# Patient Record
Sex: FEMALE | Race: WHITE | Employment: OTHER | URBAN - METROPOLITAN AREA
[De-identification: names, ages, dates, MRNs, and addresses within clinical notes are randomized per-mention and may not be internally consistent; named-entity substitution may affect disease eponyms.]

---

## 2017-03-10 ENCOUNTER — ANESTHESIA EVENT (OUTPATIENT)
Dept: ENDOSCOPY | Age: 67
End: 2017-03-10
Payer: MEDICARE

## 2017-03-13 ENCOUNTER — SURGERY (OUTPATIENT)
Age: 67
End: 2017-03-13

## 2017-03-13 ENCOUNTER — ANESTHESIA (OUTPATIENT)
Dept: ENDOSCOPY | Age: 67
End: 2017-03-13
Payer: MEDICARE

## 2017-03-13 ENCOUNTER — HOSPITAL ENCOUNTER (OUTPATIENT)
Age: 67
Setting detail: OUTPATIENT SURGERY
Discharge: HOME | End: 2017-03-13
Attending: INTERNAL MEDICINE | Admitting: INTERNAL MEDICINE
Payer: MEDICARE

## 2017-03-13 VITALS
OXYGEN SATURATION: 100 % | SYSTOLIC BLOOD PRESSURE: 114 MMHG | HEART RATE: 61 BPM | HEIGHT: 68 IN | WEIGHT: 127 LBS | TEMPERATURE: 97.7 F | BODY MASS INDEX: 19.25 KG/M2 | DIASTOLIC BLOOD PRESSURE: 61 MMHG | RESPIRATION RATE: 12 BRPM

## 2017-03-13 PROCEDURE — 76060000031 HC ANESTHESIA FIRST 0.5 HR: Performed by: INTERNAL MEDICINE

## 2017-03-13 PROCEDURE — 77030009426 HC FCPS BIOP ENDOSC BSC -B: Performed by: INTERNAL MEDICINE

## 2017-03-13 PROCEDURE — 74011000250 HC RX REV CODE- 250

## 2017-03-13 PROCEDURE — 74011250636 HC RX REV CODE- 250/636

## 2017-03-13 PROCEDURE — 88305 TISSUE EXAM BY PATHOLOGIST: CPT | Performed by: INTERNAL MEDICINE

## 2017-03-13 PROCEDURE — 74011250636 HC RX REV CODE- 250/636: Performed by: NURSE ANESTHETIST, CERTIFIED REGISTERED

## 2017-03-13 PROCEDURE — 76040000019: Performed by: INTERNAL MEDICINE

## 2017-03-13 PROCEDURE — 74011000250 HC RX REV CODE- 250: Performed by: NURSE ANESTHETIST, CERTIFIED REGISTERED

## 2017-03-13 RX ORDER — FAMOTIDINE 10 MG/ML
20 INJECTION INTRAVENOUS ONCE
Status: DISCONTINUED | OUTPATIENT
Start: 2017-03-13 | End: 2017-03-13 | Stop reason: HOSPADM

## 2017-03-13 RX ORDER — SODIUM CHLORIDE, SODIUM LACTATE, POTASSIUM CHLORIDE, CALCIUM CHLORIDE 600; 310; 30; 20 MG/100ML; MG/100ML; MG/100ML; MG/100ML
125 INJECTION, SOLUTION INTRAVENOUS CONTINUOUS
Status: CANCELLED | OUTPATIENT
Start: 2017-03-13

## 2017-03-13 RX ORDER — MAGNESIUM SULFATE 100 %
4 CRYSTALS MISCELLANEOUS AS NEEDED
Status: CANCELLED | OUTPATIENT
Start: 2017-03-13

## 2017-03-13 RX ORDER — SODIUM CHLORIDE 0.9 % (FLUSH) 0.9 %
5-10 SYRINGE (ML) INJECTION EVERY 8 HOURS
Status: DISCONTINUED | OUTPATIENT
Start: 2017-03-13 | End: 2017-03-13 | Stop reason: HOSPADM

## 2017-03-13 RX ORDER — SODIUM CHLORIDE, SODIUM LACTATE, POTASSIUM CHLORIDE, CALCIUM CHLORIDE 600; 310; 30; 20 MG/100ML; MG/100ML; MG/100ML; MG/100ML
INJECTION, SOLUTION INTRAVENOUS
Status: DISCONTINUED | OUTPATIENT
Start: 2017-03-13 | End: 2017-03-13 | Stop reason: HOSPADM

## 2017-03-13 RX ORDER — HYDROCODONE BITARTRATE AND ACETAMINOPHEN 5; 325 MG/1; MG/1
1 TABLET ORAL
Status: CANCELLED | OUTPATIENT
Start: 2017-03-13 | End: 2017-03-13

## 2017-03-13 RX ORDER — DEXTROSE 50 % IN WATER (D50W) INTRAVENOUS SYRINGE
25-50 AS NEEDED
Status: CANCELLED | OUTPATIENT
Start: 2017-03-13

## 2017-03-13 RX ORDER — PROPOFOL 10 MG/ML
INJECTION, EMULSION INTRAVENOUS AS NEEDED
Status: DISCONTINUED | OUTPATIENT
Start: 2017-03-13 | End: 2017-03-13 | Stop reason: HOSPADM

## 2017-03-13 RX ORDER — SODIUM CHLORIDE, SODIUM LACTATE, POTASSIUM CHLORIDE, CALCIUM CHLORIDE 600; 310; 30; 20 MG/100ML; MG/100ML; MG/100ML; MG/100ML
75 INJECTION, SOLUTION INTRAVENOUS CONTINUOUS
Status: DISCONTINUED | OUTPATIENT
Start: 2017-03-13 | End: 2017-03-13 | Stop reason: HOSPADM

## 2017-03-13 RX ORDER — SODIUM CHLORIDE 0.9 % (FLUSH) 0.9 %
5-10 SYRINGE (ML) INJECTION AS NEEDED
Status: DISCONTINUED | OUTPATIENT
Start: 2017-03-13 | End: 2017-03-13 | Stop reason: HOSPADM

## 2017-03-13 RX ORDER — LIDOCAINE HYDROCHLORIDE 20 MG/ML
INJECTION, SOLUTION EPIDURAL; INFILTRATION; INTRACAUDAL; PERINEURAL AS NEEDED
Status: DISCONTINUED | OUTPATIENT
Start: 2017-03-13 | End: 2017-03-13 | Stop reason: HOSPADM

## 2017-03-13 RX ORDER — FAMOTIDINE 10 MG/ML
20 INJECTION INTRAVENOUS ONCE
Status: COMPLETED | OUTPATIENT
Start: 2017-03-13 | End: 2017-03-13

## 2017-03-13 RX ADMIN — PROPOFOL 50 MG: 10 INJECTION, EMULSION INTRAVENOUS at 09:02

## 2017-03-13 RX ADMIN — PROPOFOL 50 MG: 10 INJECTION, EMULSION INTRAVENOUS at 08:46

## 2017-03-13 RX ADMIN — LIDOCAINE HYDROCHLORIDE 60 MG: 20 INJECTION, SOLUTION EPIDURAL; INFILTRATION; INTRACAUDAL; PERINEURAL at 09:03

## 2017-03-13 RX ADMIN — FAMOTIDINE 20 MG: 10 INJECTION, SOLUTION INTRAVENOUS at 07:50

## 2017-03-13 RX ADMIN — SODIUM CHLORIDE, SODIUM LACTATE, POTASSIUM CHLORIDE, CALCIUM CHLORIDE: 600; 310; 30; 20 INJECTION, SOLUTION INTRAVENOUS at 08:46

## 2017-03-13 RX ADMIN — SODIUM CHLORIDE, SODIUM LACTATE, POTASSIUM CHLORIDE, AND CALCIUM CHLORIDE 75 ML/HR: 600; 310; 30; 20 INJECTION, SOLUTION INTRAVENOUS at 07:50

## 2017-03-13 RX ADMIN — PROPOFOL 50 MG: 10 INJECTION, EMULSION INTRAVENOUS at 08:52

## 2017-03-13 RX ADMIN — PROPOFOL 50 MG: 10 INJECTION, EMULSION INTRAVENOUS at 08:55

## 2017-03-13 NOTE — IP AVS SNAPSHOT
[address] [facility] 
 
 
 [address] [id number] [phone] Patient: Monster Eller MRN: [id number] [facility]:3/87/3211 You are allergic to the following Allergen Reactions Cephalosporins Other (comments) Unable to take Codeine Nausea and Vomiting Penicillins Anaphylaxis Recent Documentation Height Weight Breastfeeding? BMI OB Status Smoking Status 1.727 m 57.6 kg No 19.31 kg/m2 Hysterectomy Never Smoker Emergency Contacts Name Discharge Info Relation Home Work Mobile Handy Russ DISCHARGE CAREGIVER [3] Spouse [3] [phone] About your hospitalization You were admitted on:  March 13, 2017 You last received care in the:  [facility] You were discharged on:  March 13, 2017 Unit phone number:  [phone] Why you were hospitalized Your primary diagnosis was:  Not on File Providers Seen During Your Hospitalizations Provider Role Specialty Primary office phone Margot Hendrix MD Attending Provider Gastroenterology [phone] Your Primary Care Physician (PCP) Primary Care Physician Office Phone Office Fax Tracy Aquino [phone] Follow-up Information Follow up With Details Comments Contact Info Enoch Kimball MD   [address] [facility] [address] [place] [address] 
[phone] Current Discharge Medication List  
  
CONTINUE these medications which have CHANGED Dose & Instructions Dispensing Information Comments Morning Noon Evening Bedtime  
 celecoxib 200 mg capsule Commonly known as:  CeleBREX What changed:   
- when to take this 
- reasons to take this Your next dose is: Today, Tomorrow Other:  _________ Dose:  200 mg Take 1 Cap by mouth daily. Quantity:  90 Cap Refills:  1 montelukast 10 mg tablet Commonly known as:  ELDAULAIR What changed:  See the new instructions. Your next dose is: Today, Tomorrow Other:  _________  
   
   
 take 1 tablet by mouth daily Quantity:  90 Tab Refills:  2 CONTINUE these medications which have NOT CHANGED Dose & Instructions Dispensing Information Comments Morning Noon Evening Bedtime  
 estradiol 0.1 mg/24 hr  
Commonly known as:  Cristina Morita Your next dose is: Today, Tomorrow Other:  _________ Dose:  1 Patch 1 Patch by TransDERmal route every Monday. Refills:  0  
     
   
   
   
  
 fluticasone 50 mcg/actuation nasal spray Commonly known as:  Imagene Poot Your next dose is: Today, Tomorrow Other:  _________  
   
   
 instill 2 sprays into each nostril daily Quantity:  48 g Refills:  3 ZyrTEC 10 mg tablet Generic drug:  cetirizine Your next dose is: Today, Tomorrow Other:  _________ Take  by mouth daily. Refills:  0 Discharge Instructions DISCHARGE SUMMARY from Nurse POST-PROCEDURE INSTRUCTIONS: 
 
Call your Physician if you: 
? Observe any excess bleeding. ? Develop a temperature over 100.5o F. 
? Experience abdominal, shoulder or chest pain. ? Notice any signs of decreased circulation or nerve impairment to an extremity such as a change in color, persistent numbness, tingling, coldness or increase in pain. ? Vomit blood or you have nausea and vomiting lasting longer than 4 hours. ? Are unable to take medications. ? Are unable to urinate within 8 hours after discharge following general anesthesia or intravenous sedation.  
 
For the next 24 hours after receiving general anesthesia or intravenous sedation, or while taking prescription Narcotics, limit your activities: 
? Do NOT drive a motor vehicle, operate hazard machinery or power tools, or perform tasks that require coordination. The medication you received during your procedure may have some effect on your mental awareness. ? Do NOT make important personal or business decisions. The medication you received during your procedure may have some effect on your mental awareness. ? Do NOT drink alcoholic beverages. These drinks do not mix well with the medications that have been given to you. ? Upon discharge from the hospital, you must be accompanied by a responsible adult. ? Resume your diet as directed by your physician. ? Resume medications as your physician has prescribed. ? Please give a list of your current medications to your Primary Care Provider. ? Please update this list whenever your medications are discontinued, doses are changed, or new medications (including over-the-counter products) are added. ? Please carry medication information at all times in case of emergency situations. These are general instructions for a healthy lifestyle: No smoking/ No tobacco products/ Avoid exposure to second hand smoke. ? Surgeon General's Warning:  Quitting smoking now greatly reduces serious risk to your health. Obesity, smoking, and a sedentary lifestyle greatly increase your risk for illness. ? A healthy diet, regular physical exercise & weight monitoring are important for maintaining a healthy lifestyle ? You may be retaining fluid if you have a history of heart failure or if you experience any of the following symptoms:  Weight gain of 3 pounds or more overnight or 5 pounds in a week, increased swelling in our hands or feet or shortness of breath while lying flat in bed. Please call your doctor as soon as you notice any of these symptoms; do not wait until your next office visit. Recognize signs and symptoms of STROKE: 
F  -  Face looks uneven A  -  Arms unable to move or move unevenly S  -  Speech slurred or non-existent T  -  Time to call 911 - as soon as signs and symptoms begin - DO NOT go back to bed or wait to see If you get better - TIME IS BRAIN. Colorectal Screening ? Colorectal cancer almost always develops from precancerous polyps (abnormal growths) in the colon or rectum. Screening tests can find precancerous polyps, so that they can be removed before they turn into cancer. Screening tests can also find colorectal cancer early, when treatment works best. 
? Speak with your physician about when you should begin screening and how often you should be tested. MyChart Activation Thank you for requesting access to "[employer]". Please follow the instructions below to securely access and download your online medical record. "[employer]" allows you to send messages to your doctor, view your test results, renew your prescriptions, schedule appointments, and more. How Do I Sign Up? 1. In your internet browser, go to [email or web address]. 2. Click on the First Time User? Click Here link in the Sign In box. You will see the New Member Sign Up page. 3. Enter your "[employer]" Access Code exactly as it appears below. You will not need to use this code after youve completed the sign-up process. If you do not sign up before the expiration date, you must request a new code. "[employer]" Access Code: [email or web address] Expires: 2017 10:26 AM (This is the date your "[employer]" access code will ) 4. Enter the last four digits of your Social Security Number (xxxx) and Date of Birth (mm/dd/yyyy) as indicated and click Submit. You will be taken to the next sign-up page. 5. Create a "[employer]" ID. This will be your "[employer]" login ID and cannot be changed, so think of one that is secure and easy to remember. 6. Create a "[employer]" password. You can change your password at any time. 7. Enter your Password Reset Question and Answer.  This can be used at a later time if you forget your password. 8. Enter your e-mail address. You will receive e-mail notification when new information is available in [address] 9. Click Sign Up. You can now view and download portions of your medical record. 10. Click the Download Summary menu link to download a portable copy of your medical information. Additional Information If you have questions, please call [phone]. Remember, [employer] is NOT to be used for urgent needs. For medical emergencies, dial 911. Educational references and/or instructions provided during this visit included: 
 
Colon Polyps Discharge information has been reviewed with the patient. The patient verbalized understanding. Colon Polyps: Care Instructions Your Care Instructions Colon polyps are growths in the colon or the rectum. The cause of most colon polyps is not known, and most people who get them do not have any problems. But a certain kind can turn into cancer. For this reason, regular testing for colon polyps is important for people age 48 and older and anyone who has an increased risk for colon cancer. Polyps are usually found through routine colon cancer screening tests. Although most colon polyps are not cancerous, they are usually removed and then tested for cancer. Screening for colon cancer saves lives because the cancer can usually be cured if it is caught early. If you have a polyp that is the type that can turn into cancer, you may need more tests to examine your entire colon. The doctor will remove any other polyps that he or she finds, and you will be tested more often. Follow-up care is a key part of your treatment and safety. Be sure to make and go to all appointments, and call your doctor if you are having problems. It's also a good idea to know your test results and keep a list of the medicines you take. How can you care for yourself at home? Regular exams to look for colon polyps are the best way to prevent polyps from turning into colon cancer. These can include stool tests, sigmoidoscopy, colonoscopy, and CT colonography. Talk with your doctor about a testing schedule that is right for you. To prevent polyps There is no home treatment that can prevent colon polyps. But these steps may help lower your risk for cancer. · Stay active. Being active can help you get to and stay at a healthy weight. Try to exercise on most days of the week. Walking is a good choice. · Eat well. Choose a variety of vegetables, fruits, legumes (such as peas and beans), fish, poultry, and whole grains. · Do not smoke. If you need help quitting, talk to your doctor about stop-smoking programs and medicines. These can increase your chances of quitting for good. · If you drink alcohol, limit how much you drink. Limit alcohol to 2 drinks a day for men and 1 drink a day for women. When should you call for help? Call your doctor now or seek immediate medical care if: 
· You have severe belly pain. · Your stools are maroon or very bloody. Watch closely for changes in your health, and be sure to contact your doctor if: 
· You have a fever. · You have nausea or vomiting. · You have a change in bowel habits (new constipation or diarrhea). · Your symptoms get worse or are not improving as expected. Where can you learn more? Go to http://michelle-roro.info/. Enter 95 477447 in the search box to learn more about \"Colon Polyps: Care Instructions. \" Current as of: August 24, 2016 Content Version: 11.1 © 8175-2664 [employer]. Care instructions adapted under license by [employer] (which disclaims liability or warranty for this information).  If you have questions about a medical condition or this instruction, always ask your healthcare professional. [address] any warranty or liability for your use of this information. Colonoscopy: What to Expect at [facility] Your Recovery After you have a colonoscopy, you will stay at the clinic for 1 to 2 hours until the medicines wear off. Then you can go home. But you will need to arrange for a ride. Your doctor will tell you when you can eat and do your other usual activities. Your doctor will talk to you about when you will need your next colonoscopy. Your doctor can help you decide how often you need to be checked. This will depend on the results of your test and your risk for colorectal cancer. After the test, you may be bloated or have gas pains. You may need to pass gas. If a biopsy was done or a polyp was removed, you may have streaks of blood in your stool (feces) for a few days. This care sheet gives you a general idea about how long it will take for you to recover. But each person recovers at a different pace. Follow the steps below to get better as quickly as possible. How can you care for yourself at home? Activity · Rest when you feel tired. · You can do your normal activities when it feels okay to do so. Diet · Follow your doctor's directions for eating. · Unless your doctor has told you not to, drink plenty of fluids. This helps to replace the fluids that were lost during the colon prep. · Do not drink alcohol. Medicines · Your doctor will tell you if and when you can restart your medicines. He or she will also give you instructions about taking any new medicines. · If you take blood thinners, such as warfarin (Coumadin), clopidogrel (Plavix), or aspirin, be sure to talk to your doctor. He or she will tell you if and when to start taking those medicines again. Make sure that you understand exactly what your doctor wants you to do.  
· If polyps were removed or a biopsy was done during the test, your doctor may tell you not to take aspirin or other anti-inflammatory medicines for a few days. These include ibuprofen (Advil, Motrin) and naproxen (Aleve). Other instructions · For your safety, do not drive or operate machinery until the medicine wears off and you can think clearly. Your doctor may tell you not to drive or operate machinery until the day after your test. 
· Do not sign legal documents or make major decisions until the medicine wears off and you can think clearly. The anesthesia can make it hard for you to fully understand what you are agreeing to. Follow-up care is a key part of your treatment and safety. Be sure to make and go to all appointments, and call your doctor if you are having problems. It's also a good idea to know your test results and keep a list of the medicines you take. When should you call for help? Call 911 anytime you think you may need emergency care. For example, call if: 
· You passed out (lost consciousness). · You pass maroon or bloody stools. · You have severe belly pain. Call your doctor now or seek immediate medical care if: 
· Your stools are black and tarlike. · Your stools have streaks of blood, but you did not have a biopsy or any polyps removed. · You have belly pain, or your belly is swollen and firm. · You vomit. · You have a fever. · You are very dizzy. Watch closely for changes in your health, and be sure to contact your doctor if you have any problems. Where can you learn more? Go to http://michelle-roro.info/. Enter E264 in the search box to learn more about \"Colonoscopy: What to Expect at Home. \" Current as of: August 9, 2016 Content Version: 11.1 © 5108-0993 [employer]. Care instructions adapted under license by [employer] (which disclaims liability or warranty for this information).  If you have questions about a medical condition or this instruction, always ask your healthcare professional. [address] any warranty or liability for your use of this information. Discharge Orders None Introducing [facility]! [facility] introduces [employer] patient portal. Now you can access parts of your medical record, email your doctor's office, and request medication refills online. 1. In your internet browser, go to [email or web address] 2. Click on the First Time User? Click Here link in the Sign In box. You will see the New Member Sign Up page. 3. Enter your [employer] Access Code exactly as it appears below. You will not need to use this code after youve completed the sign-up process. If you do not sign up before the expiration date, you must request a new code. · [employer] Access Code: [email or web address] Expires: 6/7/2017 10:26 AM 
 
4. Enter the last four digits of your Social Security Number (xxxx) and Date of Birth (mm/dd/yyyy) as indicated and click Submit. You will be taken to the next sign-up page. 5. Create a [employer] ID. This will be your [employer] login ID and cannot be changed, so think of one that is secure and easy to remember. 6. Create a [employer] password. You can change your password at any time. 7. Enter your Password Reset Question and Answer. This can be used at a later time if you forget your password. 8. Enter your e-mail address. You will receive e-mail notification when new information is available in [address] 9. Click Sign Up. You can now view and download portions of your medical record. 10. Click the Download Summary menu link to download a portable copy of your medical information. If you have questions, please visit the Frequently Asked Questions section of the [employer] website. Remember, [employer] is NOT to be used for urgent needs. For medical emergencies, dial 911. Now available from your iPhone and Android! General Information Please provide this summary of care documentation to your next provider.  
  
  
    
    
 Patient Signature: ____________________________________________________________ Date:  ____________________________________________________________  
  
Scharlene Alosa Provider Signature:  ____________________________________________________________ Date:  ____________________________________________________________

## 2017-03-13 NOTE — DISCHARGE INSTRUCTIONS
DISCHARGE SUMMARY from Nurse     POST-PROCEDURE INSTRUCTIONS:    Call your Physician if you:  ? Observe any excess bleeding. ? Develop a temperature over 100.5o F.  ? Experience abdominal, shoulder or chest pain. ? Notice any signs of decreased circulation or nerve impairment to an extremity such as a change in color, persistent numbness, tingling, coldness or increase in pain. ? Vomit blood or you have nausea and vomiting lasting longer than 4 hours. ? Are unable to take medications. ? Are unable to urinate within 8 hours after discharge following general anesthesia or intravenous sedation. For the next 24 hours after receiving general anesthesia or intravenous sedation, or while taking prescription Narcotics, limit your activities:  ? Do NOT drive a motor vehicle, operate hazard machinery or power tools, or perform tasks that require coordination. The medication you received during your procedure may have some effect on your mental awareness. ? Do NOT make important personal or business decisions. The medication you received during your procedure may have some effect on your mental awareness. ? Do NOT drink alcoholic beverages. These drinks do not mix well with the medications that have been given to you. ? Upon discharge from the hospital, you must be accompanied by a responsible adult. ? Resume your diet as directed by your physician. ? Resume medications as your physician has prescribed. ? Please give a list of your current medications to your Primary Care Provider. ? Please update this list whenever your medications are discontinued, doses are changed, or new medications (including over-the-counter products) are added. ? Please carry medication information at all times in case of emergency situations. These are general instructions for a healthy lifestyle:    No smoking/ No tobacco products/ Avoid exposure to second hand smoke.    Surgeon General's Warning: Quitting smoking now greatly reduces serious risk to your health. Obesity, smoking, and a sedentary lifestyle greatly increase your risk for illness.  A healthy diet, regular physical exercise & weight monitoring are important for maintaining a healthy lifestyle   You may be retaining fluid if you have a history of heart failure or if you experience any of the following symptoms:  Weight gain of 3 pounds or more overnight or 5 pounds in a week, increased swelling in our hands or feet or shortness of breath while lying flat in bed. Please call your doctor as soon as you notice any of these symptoms; do not wait until your next office visit. Recognize signs and symptoms of STROKE:  F  -  Face looks uneven  A  -  Arms unable to move or move unevenly  S  -  Speech slurred or non-existent  T  -  Time to call 911 - as soon as signs and symptoms begin - DO NOT go back to bed or wait to see If you get better - TIME IS BRAIN. Colorectal Screening   Colorectal cancer almost always develops from precancerous polyps (abnormal growths) in the colon or rectum. Screening tests can find precancerous polyps, so that they can be removed before they turn into cancer. Screening tests can also find colorectal cancer early, when treatment works best.  Silverio Speak with your physician about when you should begin screening and how often you should be tested. ZBD Displays Activation    Thank you for requesting access to [employer]. Please follow the instructions below to securely access and download your online medical record. [employer] Displays allows you to send messages to your doctor, view your test results, renew your prescriptions, schedule appointments, and more. How Do I Sign Up? 1. In your internet browser, go to [email or web address]. 2. Click on the First Time User? Click Here link in the Sign In box. You will see the New Member Sign Up page.   3. Enter your Ac[employer] Access Code exactly as it appears below. You will not need to use this code after youve completed the sign-up process. If you do not sign up before the expiration date, you must request a new code. Vivacta Access Code: [email or web address]  Expires: 2017 10:26 AM (This is the date your Vivacta access code will )    4. Enter the last four digits of your Social Security Number (xxxx) and Date of Birth (mm/dd/yyyy) as indicated and click Submit. You will be taken to the next sign-up page. 5. Create a Symmetric Computingt ID. This will be your Vivacta login ID and cannot be changed, so think of one that is secure and easy to remember. 6. Create a Vivacta password. You can change your password at any time. 7. Enter your Password Reset Question and Answer. This can be used at a later time if you forget your password. 8. Enter your e-mail address. You will receive e-mail notification when new information is available in [address]. 9. Click Sign Up. You can now view and download portions of your medical record. 10. Click the Download Summary menu link to download a portable copy of your medical information. Additional Information    If you have questions, please call [phone]. Remember, Vivacta is NOT to be used for urgent needs. For medical emergencies, dial 911. Educational references and/or instructions provided during this visit included:    Colon Polyps            Discharge information has been reviewed with the patient. The patient verbalized understanding. Colon Polyps: Care Instructions  Your Care Instructions    Colon polyps are growths in the colon or the rectum. The cause of most colon polyps is not known, and most people who get them do not have any problems. But a certain kind can turn into cancer. For this reason, regular testing for colon polyps is important for people age 48 and older and anyone who has an increased risk for colon cancer.   Polyps are usually found through routine colon cancer screening tests. Although most colon polyps are not cancerous, they are usually removed and then tested for cancer. Screening for colon cancer saves lives because the cancer can usually be cured if it is caught early. If you have a polyp that is the type that can turn into cancer, you may need more tests to examine your entire colon. The doctor will remove any other polyps that he or she finds, and you will be tested more often. Follow-up care is a key part of your treatment and safety. Be sure to make and go to all appointments, and call your doctor if you are having problems. It's also a good idea to know your test results and keep a list of the medicines you take. How can you care for yourself at home? Regular exams to look for colon polyps are the best way to prevent polyps from turning into colon cancer. These can include stool tests, sigmoidoscopy, colonoscopy, and CT colonography. Talk with your doctor about a testing schedule that is right for you. To prevent polyps  There is no home treatment that can prevent colon polyps. But these steps may help lower your risk for cancer. · Stay active. Being active can help you get to and stay at a healthy weight. Try to exercise on most days of the week. Walking is a good choice. · Eat well. Choose a variety of vegetables, fruits, legumes (such as peas and beans), fish, poultry, and whole grains. · Do not smoke. If you need help quitting, talk to your doctor about stop-smoking programs and medicines. These can increase your chances of quitting for good. · If you drink alcohol, limit how much you drink. Limit alcohol to 2 drinks a day for men and 1 drink a day for women. When should you call for help? Call your doctor now or seek immediate medical care if:  · You have severe belly pain. · Your stools are maroon or very bloody. Watch closely for changes in your health, and be sure to contact your doctor if:  · You have a fever.   · You have nausea or vomiting. · You have a change in bowel habits (new constipation or diarrhea). · Your symptoms get worse or are not improving as expected. Where can you learn more? Go to http://michelle-roro.info/. Enter 95 [id number] in the search box to learn more about \"Colon Polyps: Care Instructions. \"  Current as of: August 24, 2016  Content Version: 11.1  © 2006-2016 [employer]. Care instructions adapted under license by [employer] (which disclaims liability or warranty for this information). If you have questions about a medical condition or this instruction, always ask your healthcare professional. [place] [address] any warranty or liability for your use of this information. Colonoscopy: What to Expect at [address] [place] [address]  After you have a colonoscopy, you will stay at the clinic for 1 to 2 hours until the medicines wear off. Then you can go home. But you will need to arrange for a ride. Your doctor will tell you when you can eat and do your other usual activities. Your doctor will talk to you about when you will need your next colonoscopy. Your doctor can help you decide how often you need to be checked. This will depend on the results of your test and your risk for colorectal cancer. After the test, you may be bloated or have gas pains. You may need to pass gas. If a biopsy was done or a polyp was removed, you may have streaks of blood in your stool (feces) for a few days. This care sheet gives you a general idea about how long it will take for you to recover. But each person recovers at a different pace. Follow the steps below to get better as quickly as possible. How can you care for yourself at home? Activity  · Rest when you feel tired. · You can do your normal activities when it feels okay to do so. Diet  · Follow your doctor's directions for eating. · Unless your doctor has told you not to, drink plenty of fluids.  This helps to replace the fluids that were lost during the colon prep. · Do not drink alcohol. Medicines  · Your doctor will tell you if and when you can restart your medicines. He or she will also give you instructions about taking any new medicines. · If you take blood thinners, such as warfarin (Coumadin), clopidogrel (Plavix), or aspirin, be sure to talk to your doctor. He or she will tell you if and when to start taking those medicines again. Make sure that you understand exactly what your doctor wants you to do. · If polyps were removed or a biopsy was done during the test, your doctor may tell you not to take aspirin or other anti-inflammatory medicines for a few days. These include ibuprofen (Advil, Motrin) and naproxen (Aleve). Other instructions  · For your safety, do not drive or operate machinery until the medicine wears off and you can think clearly. Your doctor may tell you not to drive or operate machinery until the day after your test.  · Do not sign legal documents or make major decisions until the medicine wears off and you can think clearly. The anesthesia can make it hard for you to fully understand what you are agreeing to. Follow-up care is a key part of your treatment and safety. Be sure to make and go to all appointments, and call your doctor if you are having problems. It's also a good idea to know your test results and keep a list of the medicines you take. When should you call for help? Call 911 anytime you think you may need emergency care. For example, call if:  · You passed out (lost consciousness). · You pass maroon or bloody stools. · You have severe belly pain. Call your doctor now or seek immediate medical care if:  · Your stools are black and tarlike. · Your stools have streaks of blood, but you did not have a biopsy or any polyps removed. · You have belly pain, or your belly is swollen and firm. · You vomit. · You have a fever. · You are very dizzy.   Watch closely for changes in your health, and be sure to contact your doctor if you have any problems. Where can you learn more? Go to http://michelle-roro.info/. Enter E264 in the search box to learn more about \"Colonoscopy: What to Expect at Home. \"  Current as of: August 9, 2016  Content Version: 11.1  © 2570-3984 [employer], [employer]. Care instructions adapted under license by [employer] (which disclaims liability or warranty for this information). If you have questions about a medical condition or this instruction, always ask your healthcare professional. [address] any warranty or liability for your use of this information.

## 2017-03-13 NOTE — ANESTHESIA PREPROCEDURE EVALUATION
Anesthetic History   No history of anesthetic complications            Review of Systems / Medical History  Patient summary reviewed and pertinent labs reviewed    Pulmonary  Within defined limits                 Neuro/Psych   Within defined limits           Cardiovascular  Within defined limits                     GI/Hepatic/Renal  Within defined limits              Endo/Other        Arthritis     Other Findings   Comments: Current Smoker? NO       Elective Surgery? Yes       Abstained from smoking 24 hours prior to anesthesia? N/A    Risk Factors for Postoperative nausea/vomiting:       History of postoperative nausea/vomiting? NO       Female? YES       Motion sickness? YES       Intended opioid administration for postoperative analgesia?   NO           Physical Exam    Airway  Mallampati: II  TM Distance: 4 - 6 cm  Neck ROM: normal range of motion   Mouth opening: Normal     Cardiovascular  Regular rate and rhythm,  S1 and S2 normal,  no murmur, click, rub, or gallop             Dental    Dentition: Caps/crowns     Pulmonary                 Abdominal  GI exam deferred       Other Findings            Anesthetic Plan    ASA: 2  Anesthesia type: MAC            Anesthetic plan and risks discussed with: Patient

## 2017-03-13 NOTE — ANESTHESIA POSTPROCEDURE EVALUATION
Post-Anesthesia Evaluation and Assessment    Patient: Brian Kahn MRN: [id number]  SSN: [id number]    YOB: 1950  Age: 78 y.o. Sex: female      Data from PACU flowsheet    Cardiovascular Function/Vital Signs  Visit Vitals    /75    Pulse 66    Temp 36.5 °C (97.7 °F)    Resp 16    Ht 5' 8\" (1.727 m)    Wt 57.6 kg (127 lb)    SpO2 99%    Breastfeeding No    BMI 19.31 kg/m2       Patient is status post MAC anesthesia for Procedure(s):  COLONOSCOPY / polypectomy. Nausea/Vomiting: controlled    Postoperative hydration reviewed and adequate. Pain:  Pain Scale 1: Numeric (0 - 10) (03/13/17 0739)  Pain Intensity 1: 0 (03/13/17 0739)   Managed      Mental Status and Level of Consciousness: Alert and oriented     Pulmonary Status:   O2 Device: CO2 nasal cannula (03/13/17 0910)   Adequate oxygenation and airway patent    Complications related to anesthesia: None    Post-anesthesia assessment completed.  No concerns    Signed By: Martín Crowder MD     March 13, 2017

## 2017-03-13 NOTE — PROCEDURES
[email or web address]  [phone]        Brief Procedure Note    Sasha Kilgore  1950  [id number]    Date of Procedure: 3/13/2017    Preoperative diagnosis: Z12.11    Postoperative diagnosis: Colon Polyp, Diverticulosis    Description of Findings: same    Sedation/Anesthesia: Monitored Anesthesia Care; See Anesthesia Note    Procedure: Procedure(s):  COLONOSCOPY / polypectomy    :  Dr. Johnathon Leo MD    Assistant(s): Endoscopy Technician-1: Nestor Meza  Endoscopy RN-1: Chandni Chu RN    EBL:None    Specimens:   ID Type Source Tests Collected by Time Destination   1 : Cecum Polyp Preservative Cecum  Johnathon Leo MD 3/13/2017 8332 Pathology       Findings: See printed and scanned procedure note    Complications: None    Dr. Johnathon Leo MD  3/13/2017  9:19 AM    Johnathon Leo MD  [facility] [address], [address] [place] [address] [phone]  [email or web address]

## 2017-04-21 RX ORDER — FLUTICASONE PROPIONATE 50 MCG
SPRAY, SUSPENSION (ML) NASAL
Qty: 48 G | Refills: 3 | Status: SHIPPED | OUTPATIENT
Start: 2017-04-21 | End: 2018-03-06 | Stop reason: ALTCHOICE

## 2017-07-05 ENCOUNTER — HOSPITAL ENCOUNTER (OUTPATIENT)
Dept: LAB | Age: 67
Discharge: HOME | End: 2017-07-05
Payer: MEDICARE

## 2017-07-05 DIAGNOSIS — E78.5 HYPERLIPIDEMIA LDL GOAL <100: ICD-10-CM

## 2017-07-05 DIAGNOSIS — Z00.00 ROUTINE GENERAL MEDICAL EXAMINATION AT A HEALTH CARE FACILITY: ICD-10-CM

## 2017-07-05 LAB
ALBUMIN SERPL BCP-MCNC: 4.1 G/DL (ref 3.4–5)
ALBUMIN/GLOB SERPL: 1.6 {RATIO} (ref 0.8–1.7)
ALP SERPL-CCNC: 56 U/L (ref 45–117)
ALT SERPL-CCNC: 24 U/L (ref 13–56)
ANION GAP BLD CALC-SCNC: 9 MMOL/L (ref 3–18)
AST SERPL W P-5'-P-CCNC: 21 U/L (ref 15–37)
BASOPHILS # BLD AUTO: 0 K/UL (ref 0–0.06)
BASOPHILS # BLD: 1 % (ref 0–2)
BILIRUB SERPL-MCNC: 0.3 MG/DL (ref 0.2–1)
BUN SERPL-MCNC: 22 MG/DL (ref 7–18)
BUN/CREAT SERPL: 39 (ref 12–20)
CALCIUM SERPL-MCNC: 9.6 MG/DL (ref 8.5–10.1)
CHLORIDE SERPL-SCNC: 106 MMOL/L (ref 100–108)
CHOLEST SERPL-MCNC: 261 MG/DL
CO2 SERPL-SCNC: 25 MMOL/L (ref 21–32)
CREAT SERPL-MCNC: 0.56 MG/DL (ref 0.6–1.3)
DIFFERENTIAL METHOD BLD: ABNORMAL
EOSINOPHIL # BLD: 0.1 K/UL (ref 0–0.4)
EOSINOPHIL NFR BLD: 3 % (ref 0–5)
ERYTHROCYTE [DISTWIDTH] IN BLOOD BY AUTOMATED COUNT: 14.1 % (ref 11.6–14.5)
GLOBULIN SER CALC-MCNC: 2.6 G/DL (ref 2–4)
GLUCOSE SERPL-MCNC: 81 MG/DL (ref 74–99)
HCT VFR BLD AUTO: 47 % (ref 35–45)
HDLC SERPL-MCNC: 84 MG/DL (ref 40–60)
HDLC SERPL: 3.1 {RATIO} (ref 0–5)
HGB BLD-MCNC: 15.1 G/DL (ref 12–16)
LDLC SERPL CALC-MCNC: 164.2 MG/DL (ref 0–100)
LIPID PROFILE,FLP: ABNORMAL
LYMPHOCYTES # BLD AUTO: 28 % (ref 21–52)
LYMPHOCYTES # BLD: 1.5 K/UL (ref 0.9–3.6)
MCH RBC QN AUTO: 30.5 PG (ref 24–34)
MCHC RBC AUTO-ENTMCNC: 32.1 G/DL (ref 31–37)
MCV RBC AUTO: 94.9 FL (ref 74–97)
MONOCYTES # BLD: 0.3 K/UL (ref 0.05–1.2)
MONOCYTES NFR BLD AUTO: 6 % (ref 3–10)
NEUTS SEG # BLD: 3.6 K/UL (ref 1.8–8)
NEUTS SEG NFR BLD AUTO: 62 % (ref 40–73)
PLATELET # BLD AUTO: 271 K/UL (ref 135–420)
PMV BLD AUTO: 10.8 FL (ref 9.2–11.8)
POTASSIUM SERPL-SCNC: 4.4 MMOL/L (ref 3.5–5.5)
PROT SERPL-MCNC: 6.7 G/DL (ref 6.4–8.2)
RBC # BLD AUTO: 4.95 M/UL (ref 4.2–5.3)
SODIUM SERPL-SCNC: 140 MMOL/L (ref 136–145)
TRIGL SERPL-MCNC: 64 MG/DL (ref ?–150)
TSH SERPL DL<=0.05 MIU/L-ACNC: 0.88 UIU/ML (ref 0.36–3.74)
VLDLC SERPL CALC-MCNC: 12.8 MG/DL
WBC # BLD AUTO: 5.6 K/UL (ref 4.6–13.2)

## 2017-07-05 PROCEDURE — 36415 COLL VENOUS BLD VENIPUNCTURE: CPT | Performed by: INTERNAL MEDICINE

## 2017-07-05 PROCEDURE — 85025 COMPLETE CBC W/AUTO DIFF WBC: CPT | Performed by: INTERNAL MEDICINE

## 2017-07-05 PROCEDURE — 80061 LIPID PANEL: CPT | Performed by: INTERNAL MEDICINE

## 2017-07-05 PROCEDURE — 84443 ASSAY THYROID STIM HORMONE: CPT | Performed by: INTERNAL MEDICINE

## 2017-07-05 PROCEDURE — 80053 COMPREHEN METABOLIC PANEL: CPT | Performed by: INTERNAL MEDICINE

## 2017-07-06 ENCOUNTER — OFFICE VISIT (OUTPATIENT)
Dept: INTERNAL MEDICINE CLINIC | Age: 67
End: 2017-07-06

## 2017-07-06 VITALS
TEMPERATURE: 98 F | HEIGHT: 68 IN | SYSTOLIC BLOOD PRESSURE: 120 MMHG | HEART RATE: 70 BPM | DIASTOLIC BLOOD PRESSURE: 74 MMHG | RESPIRATION RATE: 12 BRPM | OXYGEN SATURATION: 98 % | WEIGHT: 127.6 LBS | BODY MASS INDEX: 19.34 KG/M2

## 2017-07-06 DIAGNOSIS — R30.0 DYSURIA: Primary | ICD-10-CM

## 2017-07-06 DIAGNOSIS — R39.9 UTI SYMPTOMS: ICD-10-CM

## 2017-07-06 LAB
BILIRUB UR QL STRIP: NEGATIVE
GLUCOSE UR-MCNC: NEGATIVE MG/DL
KETONES P FAST UR STRIP-MCNC: NEGATIVE MG/DL
PH UR STRIP: 5.5 [PH] (ref 4.6–8)
PROT UR QL STRIP: NEGATIVE MG/DL
SP GR UR STRIP: 1.02 (ref 1–1.03)
UA UROBILINOGEN AMB POC: NORMAL (ref 0.2–1)
URINALYSIS CLARITY POC: CLEAR
URINALYSIS COLOR POC: YELLOW
URINE BLOOD POC: NORMAL
URINE LEUKOCYTES POC: NEGATIVE
URINE NITRITES POC: NEGATIVE

## 2017-07-06 NOTE — MR AVS SNAPSHOT
Visit Information Date & Time Provider Department Dept. Phone Encounter #  
 7/6/2017 11:00 AM Kisha Chavez MD Internist of [address] [id number] Your Appointments 7/12/2017  3:00 PM  
PHYSICAL with Kisha Chavez MD  
Internist of [address] [place] [address]) Appt Note: rpe 1yr rm; rpe 1yr rm; RESCHEDULED FROM 06/26/2017  
 [address] [facility], [address] [address] [address] [facility]  
  
   
 [address], [address] Upcoming Health Maintenance Date Due  
 MEDICARE YEARLY EXAM 2/24/2016 Pneumococcal 65+ Low/Medium Risk (2 of 2 - PPSV23) 6/20/2017 INFLUENZA AGE 9 TO ADULT 8/1/2017 GLAUCOMA SCREENING Q2Y 4/1/2018 BREAST CANCER SCRN MAMMOGRAM 11/10/2018 COLONOSCOPY 3/13/2022 DTaP/Tdap/Td series (2 - Td) 6/20/2026 Allergies as of 7/6/2017  Review Complete On: 7/6/2017 By: Ervin Seaman LPN Severity Noted Reaction Type Reactions Cephalosporins    Other (comments) Unable to take Codeine  08/06/2014    Nausea and Vomiting Penicillins    Anaphylaxis Current Immunizations  Never Reviewed No immunizations on file. Not reviewed this visit You Were Diagnosed With   
  
 Codes Comments Dysuria    -  Primary ICD-10-CM: R30.0 ICD-9-CM: 788.1 UTI symptoms     ICD-10-CM: R39.9 ICD-9-CM: 788.99 Vitals BP Pulse Temp Resp Height(growth percentile) Weight(growth percentile) 120/74 70 98 °F (36.7 °C) (Oral) 12 5' 8\" (1.727 m) 127 lb 9.6 oz (57.9 kg) SpO2 BMI OB Status Smoking Status 98% 19.4 kg/m2 Hysterectomy Never Smoker Vitals History BMI and BSA Data Body Mass Index Body Surface Area  
 19.4 kg/m 2 1.67 m 2 Preferred Pharmacy Pharmacy Name Phone [address], [address] [place] [address] [phone] Your Updated Medication List  
  
   
 This list is accurate as of: 7/6/17 11:07 AM.  Always use your most recent med list.  
  
  
  
  
 celecoxib 200 mg capsule Commonly known as:  CeleBREX Take 1 Cap by mouth daily. estradiol 0.1 mg/24 hr  
Commonly known as:  VIVELLE  
1 Patch by TransDERmal route every Monday. fluticasone 50 mcg/actuation nasal spray Commonly known as:  FLONASE  
instill 2 sprays into each nostril once daily  
  
 montelukast 10 mg tablet Commonly known as:  SINGULAIR  
take 1 tablet by mouth daily ZyrTEC 10 mg tablet Generic drug:  cetirizine Take  by mouth daily. We Performed the Following AMB POC URINALYSIS DIP STICK AUTO W/O MICRO [44611 CPT(R)] Introducing [facility]! Alyssa Abebe introduces Any.DO patient portal. Now you can access parts of your medical record, email your doctor's office, and request medication refills online. 1. In your internet browser, go to [email or web address] 2. Click on the First Time User? Click Here link in the Sign In box. You will see the New Member Sign Up page. 3. Enter your Any.DO Access Code exactly as it appears below. You will not need to use this code after youve completed the sign-up process. If you do not sign up before the expiration date, you must request a new code. · Any.DO Access Code: [email or web address] Expires: 10/4/2017 11:07 AM 
 
4. Enter the last four digits of your Social Security Number (xxxx) and Date of Birth (mm/dd/yyyy) as indicated and click Submit. You will be taken to the next sign-up page. 5. Create a Cannon[employer]t ID. This will be your Any.DO login ID and cannot be changed, so think of one that is secure and easy to remember. 6. Create a Any.DO password. You can change your password at any time. 7. Enter your Password Reset Question and Answer. This can be used at a later time if you forget your password. 8. Enter your e-mail address.  You will receive e-mail notification when new information is available in [place] [employer]. 9. Click Sign Up. You can now view and download portions of your medical record. 10. Click the Download Summary menu link to download a portable copy of your medical information. If you have questions, please visit the Frequently Asked Questions section of the [employer] website. Remember, [employer] is NOT to be used for urgent needs. For medical emergencies, dial 911. Now available from your iPhone and Android! Please provide this summary of care documentation to your next provider. Your primary care clinician is listed as Andre Jordan. If you have any questions after today's visit, please call [phone].

## 2017-07-06 NOTE — PROGRESS NOTES
Brandon Shelton 1950, is a 78 y.o. female, who is seen today for evaluation of urinary and back symptoms. 2 evenings ago she noted some urinary urgency but only passed small amount of urine and yesterday the same pattern was persisting and again this morning the same pattern one time. No dysuria. This morning when she awakened she had discomfort all across the mid and lower lumbar areas and was quite concerned she might have a kidney infection. Also in the past she had had urinary symptoms and then started passing a lot of blood and she does not want to get to that stage again. She does not have frequent urinary infections but does have urinary urgency with larger quantities of urine quite commonly. Past Medical History:   Diagnosis Date    Allergic rhinitis     Chronic pain     lower back    Hyperlipidemia     Raynaud phenomenon      Current Outpatient Prescriptions   Medication Sig Dispense Refill    fluticasone (FLONASE) 50 mcg/actuation nasal spray instill 2 sprays into each nostril once daily 48 g 3    montelukast (SINGULAIR) 10 mg tablet take 1 tablet by mouth daily (Patient taking differently: take 1 tablet by mouth HS) 90 Tab 2    celecoxib (CELEBREX) 200 mg capsule Take 1 Cap by mouth daily. (Patient taking differently: Take 200 mg by mouth daily as needed.) 90 Cap 1    cetirizine (ZYRTEC) 10 mg tablet Take  by mouth daily.  estradiol (VIVELLE) 0.1 mg/24 hr 1 Patch by TransDERmal route every Monday. Visit Vitals    /74    Pulse 70    Temp 98 °F (36.7 °C) (Oral)    Resp 12    Ht 5' 8\" (1.727 m)    Wt 127 lb 9.6 oz (57.9 kg)    SpO2 98%    BMI 19.4 kg/m2     There is no CVA tenderness. The pain is in the lower back and there is no tenderness in the lumbar muscles or along the lumbar spine. No abdominal tenderness and no hepatosplenomegaly or masses. Urinalysis is normal this morning.     Assessment: #1.  Musculoskeletal low back pain mild, no treatment is indicated. #2.  Urinary frequency with small amounts of urine being passed, her urine does show a specific gravity of 1.025 consistent with some probable mild dehydration accounting for her urinary symptoms the last 2 days. She will drink additional water and she will call if new symptoms or worsening symptoms develop. She will follow-up as previously planned for a more complete checkup next week. Clifton Sandoval MD FACP    Please note: This document has been produced using voice recognition software. Unrecognized errors in transcription may be present.

## 2017-07-12 ENCOUNTER — OFFICE VISIT (OUTPATIENT)
Dept: INTERNAL MEDICINE CLINIC | Age: 67
End: 2017-07-12

## 2017-07-12 VITALS
SYSTOLIC BLOOD PRESSURE: 128 MMHG | RESPIRATION RATE: 12 BRPM | WEIGHT: 127.4 LBS | OXYGEN SATURATION: 97 % | HEIGHT: 68 IN | DIASTOLIC BLOOD PRESSURE: 74 MMHG | HEART RATE: 75 BPM | TEMPERATURE: 98.5 F | BODY MASS INDEX: 19.31 KG/M2

## 2017-07-12 DIAGNOSIS — G89.29 CHRONIC MIDLINE LOW BACK PAIN WITHOUT SCIATICA: ICD-10-CM

## 2017-07-12 DIAGNOSIS — M54.50 CHRONIC MIDLINE LOW BACK PAIN WITHOUT SCIATICA: ICD-10-CM

## 2017-07-12 DIAGNOSIS — Z23 ENCOUNTER FOR IMMUNIZATION: ICD-10-CM

## 2017-07-12 DIAGNOSIS — J30.1 SEASONAL ALLERGIC RHINITIS DUE TO POLLEN: ICD-10-CM

## 2017-07-12 DIAGNOSIS — E78.5 HYPERLIPIDEMIA LDL GOAL <100: Primary | ICD-10-CM

## 2017-07-12 NOTE — PROGRESS NOTES
Guera Whitfield 1950, is a 78 y.o. female, who is seen today for reevaluation of hyperlipidemia chronic back pain allergic rhinitis. She had rather severe sciatica on the left about 4 years ago and after that cleared she developed more persistent mid lumbar pain without radiation. She is currently getting some physical therapy for this but is also had steroid injections, presumably epidural.  She sees a physiatrist, Dr. Bentley Glilespie and is happy with his care. She is learning more about how to properly lift and take care of her back and strengthen her core. She does not like using Celebrex all the time so she uses it some days and other days uses ibuprofen and whenever possible she goes without medicine. She has hyperlipidemia and eats a very healthy diet. She maintains normal weight which is unchanged from last year. She has seasonal allergic rhinitis and currently is doing well but is maintained on 3 drugs. She sees a gynecologist for menopausal symptoms.     Past Medical History:   Diagnosis Date    Allergic rhinitis     Chronic pain     lower back    Hyperlipidemia     Raynaud phenomenon      Past Surgical History:   Procedure Laterality Date    COLONOSCOPY N/A 3/13/2017    COLONOSCOPY / polypectomy performed by Carol Rizo MD at [facility] ENDOSCOPY    ENDOSCOPY, COLON, DIAGNOSTIC  2012     5 years, Dr. Terrance Cloud HX COLONOSCOPY  3/14/12    2 mm tubular adenoma--Terell    HX COLONOSCOPY  03/13/2017    5mm polyp, 5 yrs, Dr. Rakesh Lowry HX HYSTERECTOMY      supracervical    HX OOPHORECTOMY      bilateral    HX TONSILLECTOMY       Current Outpatient Prescriptions   Medication Sig Dispense Refill    fluticasone (FLONASE) 50 mcg/actuation nasal spray instill 2 sprays into each nostril once daily 48 g 3    montelukast (SINGULAIR) 10 mg tablet take 1 tablet by mouth daily (Patient taking differently: take 1 tablet by mouth HS) 90 Tab 2    celecoxib (CELEBREX) 200 mg capsule Take 1 Cap by mouth daily. (Patient taking differently: Take 200 mg by mouth daily as needed.) 90 Cap 1    cetirizine (ZYRTEC) 10 mg tablet Take  by mouth daily.  estradiol (VIVELLE) 0.1 mg/24 hr 1 Patch by TransDERmal route every Monday. Allergies   Allergen Reactions    Cephalosporins Other (comments)     Unable to take    Codeine Nausea and Vomiting    Penicillins Anaphylaxis     Social History     Social History    Marital status:      Spouse name: N/A    Number of children: N/A    Years of education: N/A     Social History Main Topics    Smoking status: Never Smoker    Smokeless tobacco: Never Used    Alcohol use No    Drug use: No    Sexual activity: Not Asked     Other Topics Concern    None     Social History Narrative     Visit Vitals    /74    Pulse 75    Temp 98.5 °F (36.9 °C)    Resp 12    Ht 5' 8\" (1.727 m)    Wt 127 lb 6.4 oz (57.8 kg)    SpO2 97%    BMI 19.37 kg/m2     The patient is a well-developed well-nourished female in no apparent distress. HEENT: Pupils are equal and react to light and extraocular movements are full. Ear canals and tympanic membranes appear normal. Oral cavity appears normal with no oral lesions. Neck: Carotids are 2+ without bruits. No adenopathy or thyromegaly. Lungs are clear to percussion. I hear no wheezing, rales or rhonchi. Heart reveals a regular rhythm with no murmur, gallop, click or rub. The apical impulse is in the fifth interspace at the midclavicular line. Abdomen is soft and nontender with no hepatosplenomegaly or masses. Bowel sounds are normoactive and there is no distention or tympany. Extremities reveal no clubbing cyanosis or edema. Pulses are 2+. Skin reveals no suspicious skin growths. Breasts reveal no masses, skin or nipple abnormalities. No axillary adenopathy.     Results for orders placed or performed in visit on 07/06/17   AMB POC URINALYSIS DIP STICK AUTO W/O MICRO   Result Value Ref Range    Color (UA POC) Yellow     Clarity (UA POC) Clear     Glucose (UA POC) Negative Negative    Bilirubin (UA POC) Negative Negative    Ketones (UA POC) Negative Negative    Specific gravity (UA POC) 1.025 1.001 - 1.035    Blood (UA POC) Trace Negative    pH (UA POC) 5.5 4.6 - 8.0    Protein (UA POC) Negative Negative mg/dL    Urobilinogen (UA POC) 0.2 mg/dL 0.2 - 1    Nitrites (UA POC) Negative Negative    Leukocyte esterase (UA POC) Negative Negative     Other laboratory shows hemoglobin 15.1, fasting glucose 81, other chemistries are normal, cholesterol 261 HDL 84 and . TSH 0.88. Assessment: #1. Hyperlipidemia with cholesterol quite a bit higher than last year, but calculated coronary risk is still fairly low at 5.8%. She will continue very healthy diet. #2.  Chronic lumbar pain, she will continue with her physiatrist and current physical therapy. She will use Celebrex as needed and ibuprofen on other days as needed but not both on the same day. #3.  Allergic rhinitis doing well. She will continue Singulair Flonase and cetirizine. #4.  Menopausal symptoms, she will continue follow-up with her gynecologist and continue hormonal replacement therapy as long as she and her gynecologist feel that benefits outweigh risks. #5. She was seen about a week ago with some urinary frequency but that has resolved. She will receive PCV 13 now and follow-up in 1 year for physical or sooner if needed. Clifton Hernandez MD FACP    Please note: This document has been produced using voice recognition software. Unrecognized errors in transcription may be present.

## 2017-07-12 NOTE — MR AVS SNAPSHOT
Visit Information Date & Time Provider Department Dept. Phone Encounter #  
 7/12/2017  3:00 PM Jing Correa MD [facility] [id number] Your Appointments 7/19/2018  9:15 AM  
PHYSICAL with Jing Correa MD  
[facility]ist of [facility]) Appt Note: rpe  
 [address] [facility], [place] [id number] [address] [place]  
  
   
 [address], [address] Upcoming Health Maintenance Date Due  
 MEDICARE YEARLY EXAM 2/24/2016 Pneumococcal 65+ Low/Medium Risk (2 of 2 - PPSV23) 6/20/2017 INFLUENZA AGE 9 TO ADULT 8/1/2017 GLAUCOMA SCREENING Q2Y 4/1/2018 BREAST CANCER SCRN MAMMOGRAM 11/10/2018 COLONOSCOPY 3/13/2022 DTaP/Tdap/Td series (2 - Td) 6/20/2026 Allergies as of 7/12/2017  Review Complete On: 7/12/2017 By: Jing Correa MD  
  
 Severity Noted Reaction Type Reactions Cephalosporins    Other (comments) Unable to take Codeine  08/06/2014    Nausea and Vomiting Penicillins    Anaphylaxis Current Immunizations  Never Reviewed No immunizations on file. Not reviewed this visit You Were Diagnosed With   
  
 Codes Comments Hyperlipidemia LDL goal <100    -  Primary ICD-10-CM: E78.5 ICD-9-CM: 272.4 Seasonal allergic rhinitis due to pollen     ICD-10-CM: J30.1 ICD-9-CM: 477.0 Chronic midline low back pain without sciatica     ICD-10-CM: M54.5, G89.29 ICD-9-CM: 724.2, 338.29 Vitals BP Pulse Temp Resp Height(growth percentile) Weight(growth percentile) 128/74 75 98.5 °F (36.9 °C) 12 5' 8\" (1.727 m) 127 lb 6.4 oz (57.8 kg) SpO2 BMI OB Status Smoking Status 97% 19.37 kg/m2 Hysterectomy Never Smoker Vitals History BMI and BSA Data Body Mass Index Body Surface Area  
 19.37 kg/m 2 1.67 m 2 Preferred Pharmacy Pharmacy Name Phone [address] [phone] Your Updated Medication List  
  
   
This list is accurate as of: 7/12/17  3:51 PM.  Always use your most recent med list.  
  
  
  
  
 celecoxib 200 mg capsule Commonly known as:  CeleBREX Take 1 Cap by mouth daily. estradiol 0.1 mg/24 hr  
Commonly known as:  VIVELLE  
1 Patch by TransDERmal route every Monday. fluticasone 50 mcg/actuation nasal spray Commonly known as:  FLONASE  
instill 2 sprays into each nostril once daily  
  
 montelukast 10 mg tablet Commonly known as:  SINGULAIR  
take 1 tablet by mouth daily ZyrTEC 10 mg tablet Generic drug:  cetirizine Take  by mouth daily. Introducing [facility] & HEALTH SERVICES! [facility] introduces [employer] patient portal. Now you can access parts of your medical record, email your doctor's office, and request medication refills online. 1. In your internet browser, go to [email or web address] 2. Click on the First Time User? Click Here link in the Sign In box. You will see the New Member Sign Up page. 3. Enter your [employer] Access Code exactly as it appears below. You will not need to use this code after youve completed the sign-up process. If you do not sign up before the expiration date, you must request a new code. · [employer] Access Code: [email or web address] Expires: 10/4/2017 11:07 AM 
 
4. Enter the last four digits of your Social Security Number (xxxx) and Date of Birth (mm/dd/yyyy) as indicated and click Submit. You will be taken to the next sign-up page. 5. Create a R[employer] ID. This will be your [employer] login ID and cannot be changed, so think of one that is secure and easy to remember. 6. Create a [employer] password. You can change your password at any time. 7. Enter your Password Reset Question and Answer. This can be used at a later time if you forget your password. 8. Enter your e-mail address. You will receive e-mail notification when new information is available in [address] 9. Click Sign Up. You can now view and download portions of your medical record. 10. Click the Download Summary menu link to download a portable copy of your medical information. If you have questions, please visit the Frequently Asked Questions section of the [employer] website. Remember, [employer] is NOT to be used for urgent needs. For medical emergencies, dial 911. Now available from your iPhone and Android! Please provide this summary of care documentation to your next provider. Your primary care clinician is listed as Tammy Begum. Ping Fernandez. If you have any questions after today's visit, please call [phone].

## 2017-09-03 RX ORDER — MONTELUKAST SODIUM 10 MG/1
TABLET ORAL
Qty: 90 TAB | Refills: 2 | Status: SHIPPED | OUTPATIENT
Start: 2017-09-03 | End: 2018-05-30 | Stop reason: SDUPTHER

## 2017-11-13 ENCOUNTER — HOSPITAL ENCOUNTER (OUTPATIENT)
Dept: MAMMOGRAPHY | Age: 67
Discharge: HOME | End: 2017-11-13
Attending: OBSTETRICS & GYNECOLOGY
Payer: MEDICARE

## 2017-11-13 DIAGNOSIS — Z12.39 BREAST CANCER SCREENING: ICD-10-CM

## 2017-11-13 PROCEDURE — 77063 BREAST TOMOSYNTHESIS BI: CPT

## 2018-03-06 ENCOUNTER — OFFICE VISIT (OUTPATIENT)
Dept: INTERNAL MEDICINE CLINIC | Age: 68
End: 2018-03-06

## 2018-03-06 VITALS
BODY MASS INDEX: 19.79 KG/M2 | TEMPERATURE: 97.8 F | WEIGHT: 130.6 LBS | DIASTOLIC BLOOD PRESSURE: 82 MMHG | RESPIRATION RATE: 14 BRPM | HEIGHT: 68 IN | OXYGEN SATURATION: 96 % | HEART RATE: 62 BPM | SYSTOLIC BLOOD PRESSURE: 122 MMHG

## 2018-03-06 DIAGNOSIS — J30.1 ACUTE SEASONAL ALLERGIC RHINITIS DUE TO POLLEN: ICD-10-CM

## 2018-03-06 DIAGNOSIS — J01.00 ACUTE NON-RECURRENT MAXILLARY SINUSITIS: Primary | ICD-10-CM

## 2018-03-06 RX ORDER — AZITHROMYCIN 250 MG/1
TABLET, FILM COATED ORAL
Qty: 6 TAB | Refills: 0 | Status: SHIPPED | OUTPATIENT
Start: 2018-03-06 | End: 2018-03-11

## 2018-03-06 RX ORDER — FLUTICASONE PROPIONATE 50 MCG
SPRAY, SUSPENSION (ML) NASAL
Qty: 48 G | Refills: 3
Start: 2018-03-06 | End: 2020-07-27 | Stop reason: SDUPTHER

## 2018-03-06 NOTE — PROGRESS NOTES
Chief Complaint   Patient presents with   [facility]     Patient present and complains of swollen glands on right side neck. Onset 2-3 weeks. Patient states her right ear hurts. Patient states she used Ibuprofen and it helped a little. ROOM 9     1. Have you been to the ER, urgent care clinic or hospitalized since your last visit? NO.     2. Have you seen or consulted any other health care providers outside of the [address] [place] [address] since your last visit (Include any pap smears or colon screening)? YES, dentist, last seen an hour ago today 3/6/2018.

## 2018-03-06 NOTE — MR AVS SNAPSHOT
[address] [facility] 
 
 
 [address], [address] [facility] 
[phone] Patient: Claudene Potters MRN: [id number] Visit Information Date & Time Provider Department Dept. Phone Encounter #  
 3/6/2018 11:30 AM Lawanda Moreno MD [facility] [phone] [id number] Your Appointments 7/11/2018  7:45 AM  
LAB with IOC NURSE VISIT [facility] ([facility]) Appt Note: lab  
 [address], [address] [address] [address] [place]  
  
   
 [address], [address] 7/16/2018  8:30 AM  
PHYSICAL with Lawanda Moreno MD  
[facility]) Appt Note: rpe; PT R/S FROM 7/19/18  
 [address] [facility] [address] [address] [place]  
  
   
 [address], [address] Upcoming Health Maintenance Date Due  
 MEDICARE YEARLY EXAM 2/24/2016 Influenza Age 5 to Adult 8/1/2017 GLAUCOMA SCREENING Q2Y 4/1/2018 BREAST CANCER SCRN MAMMOGRAM 11/13/2019 COLONOSCOPY 3/13/2022 DTaP/Tdap/Td series (2 - Td) 6/20/2026 Allergies as of 3/6/2018  Review Complete On: 3/6/2018 By: Lawanda Moreno MD  
  
 Severity Noted Reaction Type Reactions Cephalosporins    Other (comments) Unable to take Codeine  08/06/2014    Nausea and Vomiting Penicillins    Anaphylaxis Current Immunizations  Never Reviewed Name Date Pneumococcal Conjugate (PCV-13) 7/12/2017  3:54 PM  
  
 Not reviewed this visit Vitals BP Pulse Temp Resp Height(growth percentile) Weight(growth percentile) 122/82 (BP 1 Location: Right arm, BP Patient Position: Sitting) 62 97.8 °F (36.6 °C) (Oral) 14 5' 8\" (1.727 m) 130 lb 9.6 oz (59.2 kg) SpO2 BMI OB Status Smoking Status 96% 19.86 kg/m2 Hysterectomy Never Smoker Vitals History BMI and BSA Data Body Mass Index Body Surface Area  
 19.86 kg/m 2 1.69 m 2 Preferred Pharmacy Pharmacy Name Phone [address], [address] [place] [address] [phone] Your Updated Medication List  
  
   
This list is accurate as of 3/6/18 12:17 PM.  Always use your most recent med list.  
  
  
  
  
 estradiol 0.1 mg/24 hr  
Commonly known as:  VIVELLE  
1 Patch by TransDERmal route every Monday. montelukast 10 mg tablet Commonly known as:  SINGULAIR  
take 1 tablet by mouth daily ZyrTEC 10 mg tablet Generic drug:  cetirizine Take  by mouth daily. Introducing [facility] & HEALTH SERVICES! Pinky Castellano introduces [employer] patient portal. Now you can access parts of your medical record, email your doctor's office, and request medication refills online. 1. In your internet browser, go to [email or web address] 2. Click on the First Time User? Click Here link in the Sign In box. You will see the New Member Sign Up page. 3. Enter your [facility] Access Code exactly as it appears below. You will not need to use this code after youve completed the sign-up process. If you do not sign up before the expiration date, you must request a new code. · [facility] Access Code: [email or web address] Expires: 6/4/2018 12:16 PM 
 
4. Enter the last four digits of your Social Security Number (xxxx) and Date of Birth (mm/dd/yyyy) as indicated and click Submit. You will be taken to the next sign-up page. 5. Create a [facility] ID. This will be your [facility] login ID and cannot be changed, so think of one that is secure and easy to remember. 6. Create a [facility] password. You can change your password at any time. 7. Enter your Password Reset Question and Answer. This can be used at a later time if you forget your password. 8. Enter your e-mail address. You will receive e-mail notification when new information is available in [address] 9. Click Sign Up. You can now view and download portions of your medical record. 10. Click the Download Summary menu link to download a portable copy of your medical information. If you have questions, please visit the Frequently Asked Questions section of the [employer] website. Remember, [employer] is NOT to be used for urgent needs. For medical emergencies, dial 911. Now available from your iPhone and Android! Please provide this summary of care documentation to your next provider. Your primary care clinician is listed as Fiona Cleveland. Roman Ramirez. If you have any questions after today's visit, please call [phone].

## 2018-03-06 NOTE — PROGRESS NOTES
Billie Galicia 1950, is a 80 y.o. female, who is seen today for evaluation of a soreness in her right neck, intermittent maxillary pressure bilaterally, ringing in the ears and all of this is been going on for 2 or 3 weeks. She has not had any definite fever, no chills, some slight chilliness off and on but not necessarily any worse than usual.  She saw her dentist today and had x-rays and was told none of this is anything to do with her teeth. She has a history of allergic rhinitis and continues to use Flonase. Past Medical History:   Diagnosis Date    Allergic rhinitis     Chronic pain     lower back    Hyperlipidemia     Raynaud phenomenon      Current Outpatient Prescriptions   Medication Sig Dispense Refill    fluticasone (FLONASE) 50 mcg/actuation nasal spray instill 2 sprays into each nostril once daily 48 g 3    montelukast (SINGULAIR) 10 mg tablet take 1 tablet by mouth daily 90 Tab 2    cetirizine (ZYRTEC) 10 mg tablet Take  by mouth daily.  estradiol (VIVELLE) 0.1 mg/24 hr 1 Patch by TransDERmal route every Monday. Allergies   Allergen Reactions    Cephalosporins Other (comments)     Unable to take    Codeine Nausea and Vomiting    Penicillins Anaphylaxis     Visit Vitals    /82 (BP 1 Location: Right arm, BP Patient Position: Sitting)    Pulse 62    Temp 97.8 °F (36.6 °C) (Oral)    Resp 14    Ht 5' 8\" (1.727 m)    Wt 130 lb 9.6 oz (59.2 kg)    SpO2 96%    BMI 19.86 kg/m2     Pharynx reveals no redness exudate or drainage. Nasal passages show only clear tenacious secretions. Maxillary sinuses are minimally tender. Ear canals and tympanic membranes appear normal, no wax no redness and good light reflex from both tympanic membrane. Neck reveals no adenopathy or thyromegaly. Minimal right-sided tenderness in the anterior cervical region. Assessment: Allergic rhinitis with probable superimposed maxillary sinusitis.   She will continue Flonase 2 sprays in each nostril daily and she will start azithromycin since she is allergic to penicillins. If not improving over the next 7-10 days she may require decongestants or even a brief course of prednisone. #2.  Tendinitis of uncertain etiology, seems to be in both ears. Hopefully with clearing eustachian tubes that will get better, we did discuss this at some length including the fact that tenderness is usually associated with acoustic nerve problems. Clifton Lao MD FACP    Please note: This document has been produced using voice recognition software. Unrecognized errors in transcription may be present.

## 2018-05-23 RX ORDER — FLUTICASONE PROPIONATE 50 MCG
SPRAY, SUSPENSION (ML) NASAL
Qty: 48 G | Refills: 3 | Status: SHIPPED | OUTPATIENT
Start: 2018-05-23 | End: 2019-06-29 | Stop reason: SDUPTHER

## 2018-05-30 RX ORDER — MONTELUKAST SODIUM 10 MG/1
TABLET ORAL
Qty: 90 TAB | Refills: 2 | Status: SHIPPED | OUTPATIENT
Start: 2018-05-30 | End: 2019-02-28 | Stop reason: SDUPTHER

## 2018-07-11 ENCOUNTER — LAB ONLY (OUTPATIENT)
Dept: INTERNAL MEDICINE CLINIC | Age: 68
End: 2018-07-11

## 2018-07-11 ENCOUNTER — HOSPITAL ENCOUNTER (OUTPATIENT)
Dept: LAB | Age: 68
Discharge: HOME | End: 2018-07-11
Payer: MEDICARE

## 2018-07-11 DIAGNOSIS — Z00.00 ROUTINE GENERAL MEDICAL EXAMINATION AT A HEALTH CARE FACILITY: ICD-10-CM

## 2018-07-11 DIAGNOSIS — E78.5 HYPERLIPIDEMIA, UNSPECIFIED HYPERLIPIDEMIA TYPE: Primary | ICD-10-CM

## 2018-07-11 DIAGNOSIS — E78.5 HYPERLIPIDEMIA, UNSPECIFIED HYPERLIPIDEMIA TYPE: ICD-10-CM

## 2018-07-11 LAB
ALBUMIN SERPL-MCNC: 3.8 G/DL (ref 3.4–5)
ALBUMIN/GLOB SERPL: 1.3 {RATIO} (ref 0.8–1.7)
ALP SERPL-CCNC: 58 U/L (ref 45–117)
ALT SERPL-CCNC: 21 U/L (ref 13–56)
ANION GAP SERPL CALC-SCNC: 6 MMOL/L (ref 3–18)
AST SERPL-CCNC: 16 U/L (ref 15–37)
BASOPHILS # BLD: 0.1 K/UL (ref 0–0.1)
BASOPHILS NFR BLD: 1 % (ref 0–2)
BILIRUB SERPL-MCNC: 0.5 MG/DL (ref 0.2–1)
BUN SERPL-MCNC: 20 MG/DL (ref 7–18)
BUN/CREAT SERPL: 34 (ref 12–20)
CALCIUM SERPL-MCNC: 8.9 MG/DL (ref 8.5–10.1)
CHLORIDE SERPL-SCNC: 106 MMOL/L (ref 100–108)
CHOLEST SERPL-MCNC: 250 MG/DL
CO2 SERPL-SCNC: 29 MMOL/L (ref 21–32)
CREAT SERPL-MCNC: 0.59 MG/DL (ref 0.6–1.3)
DIFFERENTIAL METHOD BLD: ABNORMAL
EOSINOPHIL # BLD: 0.1 K/UL (ref 0–0.4)
EOSINOPHIL NFR BLD: 2 % (ref 0–5)
ERYTHROCYTE [DISTWIDTH] IN BLOOD BY AUTOMATED COUNT: 14.4 % (ref 11.6–14.5)
GLOBULIN SER CALC-MCNC: 3 G/DL (ref 2–4)
GLUCOSE SERPL-MCNC: 84 MG/DL (ref 74–99)
HCT VFR BLD AUTO: 46.7 % (ref 35–45)
HDLC SERPL-MCNC: 83 MG/DL (ref 40–60)
HDLC SERPL: 3 {RATIO} (ref 0–5)
HGB BLD-MCNC: 14.6 G/DL (ref 12–16)
LDLC SERPL CALC-MCNC: 140.6 MG/DL (ref 0–100)
LIPID PROFILE,FLP: ABNORMAL
LYMPHOCYTES # BLD: 1.8 K/UL (ref 0.9–3.6)
LYMPHOCYTES NFR BLD: 30 % (ref 21–52)
MCH RBC QN AUTO: 30.2 PG (ref 24–34)
MCHC RBC AUTO-ENTMCNC: 31.3 G/DL (ref 31–37)
MCV RBC AUTO: 96.7 FL (ref 74–97)
MONOCYTES # BLD: 0.5 K/UL (ref 0.05–1.2)
MONOCYTES NFR BLD: 8 % (ref 3–10)
NEUTS SEG # BLD: 3.4 K/UL (ref 1.8–8)
NEUTS SEG NFR BLD: 59 % (ref 40–73)
PLATELET # BLD AUTO: 259 K/UL (ref 135–420)
PMV BLD AUTO: 10.4 FL (ref 9.2–11.8)
POTASSIUM SERPL-SCNC: 4.3 MMOL/L (ref 3.5–5.5)
PROT SERPL-MCNC: 6.8 G/DL (ref 6.4–8.2)
RBC # BLD AUTO: 4.83 M/UL (ref 4.2–5.3)
SODIUM SERPL-SCNC: 141 MMOL/L (ref 136–145)
TRIGL SERPL-MCNC: 132 MG/DL (ref ?–150)
TSH SERPL DL<=0.05 MIU/L-ACNC: 1.5 UIU/ML (ref 0.36–3.74)
VLDLC SERPL CALC-MCNC: 26.4 MG/DL
WBC # BLD AUTO: 5.8 K/UL (ref 4.6–13.2)

## 2018-07-11 PROCEDURE — 85025 COMPLETE CBC W/AUTO DIFF WBC: CPT | Performed by: INTERNAL MEDICINE

## 2018-07-11 PROCEDURE — 80061 LIPID PANEL: CPT | Performed by: INTERNAL MEDICINE

## 2018-07-11 PROCEDURE — 80053 COMPREHEN METABOLIC PANEL: CPT | Performed by: INTERNAL MEDICINE

## 2018-07-11 PROCEDURE — 84443 ASSAY THYROID STIM HORMONE: CPT | Performed by: INTERNAL MEDICINE

## 2018-07-11 PROCEDURE — 36415 COLL VENOUS BLD VENIPUNCTURE: CPT | Performed by: INTERNAL MEDICINE

## 2018-07-16 ENCOUNTER — OFFICE VISIT (OUTPATIENT)
Dept: INTERNAL MEDICINE CLINIC | Age: 68
End: 2018-07-16

## 2018-07-16 VITALS
DIASTOLIC BLOOD PRESSURE: 74 MMHG | OXYGEN SATURATION: 98 % | WEIGHT: 124 LBS | HEIGHT: 68 IN | TEMPERATURE: 98.1 F | BODY MASS INDEX: 18.79 KG/M2 | HEART RATE: 71 BPM | RESPIRATION RATE: 12 BRPM | SYSTOLIC BLOOD PRESSURE: 124 MMHG

## 2018-07-16 DIAGNOSIS — M26.629 TMJ SYNDROME: ICD-10-CM

## 2018-07-16 DIAGNOSIS — J30.1 SEASONAL ALLERGIC RHINITIS DUE TO POLLEN: ICD-10-CM

## 2018-07-16 DIAGNOSIS — E78.5 HYPERLIPIDEMIA LDL GOAL <100: Primary | ICD-10-CM

## 2018-07-16 DIAGNOSIS — Z23 ENCOUNTER FOR IMMUNIZATION: ICD-10-CM

## 2018-07-16 NOTE — PROGRESS NOTES
Allie Espinoza 1950, is a 80 y.o. female, who is seen today for reevaluation of hyperlipidemia allergic rhinitis TMJ syndrome, tinnitus and now with respiratory symptoms. Since yesterday she has had some nasal congestion hoarseness scratchy throat intermittent coughing but no fever. She takes her usual allergy medicines. She has been having TMJ symptoms on the right for some time had a new root canal which did not help as seen her dentist and now has been fitted for a bite plate that should be available by next week. She complains of ongoing tinnitus keeps her from getting to sleep at night so she has started using a fan and that helps her somewhat. She has a history of hyperlipidemia but with high HDL cholesterol. She eats a very healthy diet. She had a bone density last year but she says was normal so she will need another one for 10 years. Past Medical History:   Diagnosis Date    Allergic rhinitis     Chronic pain     lower back    Hyperlipidemia     Raynaud phenomenon      Past Surgical History:   Procedure Laterality Date    COLONOSCOPY N/A 3/13/2017    COLONOSCOPY / polypectomy performed by Desiree Linton MD at [facility] ENDOSCOPY    ENDOSCOPY, COLON, DIAGNOSTIC  2012     5 years, Dr. Nabeel Morley HX COLONOSCOPY  3/14/12    2 mm tubular adenoma--Terell    HX COLONOSCOPY  03/13/2017    5mm polyp, 5 yrs, Dr. Margaret Humphreys HX HYSTERECTOMY      supracervical    HX OOPHORECTOMY      bilateral    HX TONSILLECTOMY       Current Outpatient Prescriptions   Medication Sig Dispense Refill    montelukast (SINGULAIR) 10 mg tablet take 1 tablet by mouth once daily 90 Tab 2    fluticasone (FLONASE) 50 mcg/actuation nasal spray instill 2 sprays into each nostril once daily 48 g 3    fluticasone (FLONASE) 50 mcg/actuation nasal spray instill 2 sprays into each nostril once daily 48 g 3    cetirizine (ZYRTEC) 10 mg tablet Take  by mouth daily.       estradiol (VIVELLE) 0.1 mg/24 hr 1 Patch by TransDERmal route every Monday. Allergies   Allergen Reactions    Cephalosporins Other (comments)     Unable to take    Codeine Nausea and Vomiting    Penicillins Anaphylaxis     Social History     Social History    Marital status:      Spouse name: N/A    Number of children: N/A    Years of education: N/A     Social History Main Topics    Smoking status: Never Smoker    Smokeless tobacco: Never Used    Alcohol use No    Drug use: No    Sexual activity: Not Asked     Other Topics Concern    None     Social History Narrative     Visit Vitals    /74    Pulse 71    Temp 98.1 °F (36.7 °C) (Oral)    Resp 12    Ht 5' 8\" (1.727 m)    Wt 124 lb (56.2 kg)    SpO2 98%    BMI 18.85 kg/m2     The patient is a well-developed well-nourished female in no apparent distress. HEENT: Pupils are equal and react to light and extraocular movements are full. Ear canals and tympanic membranes appear normal. Oral cavity appears normal with no oral lesions. Neck: Carotids are 2+ without bruits. No adenopathy or thyromegaly. Lungs are clear to percussion. I hear no wheezing, rales or rhonchi. Heart reveals a regular rhythm with no murmur, gallop, click or rub. The apical impulse is in the fifth interspace at the midclavicular line. Abdomen is soft and nontender with no hepatosplenomegaly or masses. Bowel sounds are normoactive and there is no distention or tympany. Extremities reveal no clubbing cyanosis or edema. Pulses are 2+. Skin reveals no suspicious skin growths. Breasts reveal no masses, skin or nipple abnormalities. No axillary adenopathy.     Results for orders placed or performed during the hospital encounter of 07/11/18   CBC WITH AUTOMATED DIFF   Result Value Ref Range    WBC 5.8 4.6 - 13.2 K/uL    RBC 4.83 4.20 - 5.30 M/uL    HGB 14.6 12.0 - 16.0 g/dL    HCT 46.7 (H) 35.0 - 45.0 %    MCV 96.7 74.0 - 97.0 FL    MCH 30.2 24.0 - 34.0 PG    MCHC 31.3 31.0 - 37.0 g/dL    RDW 14.4 11.6 - 14.5 %    PLATELET 919 046 - 567 K/uL    MPV 10.4 9.2 - 11.8 FL    NEUTROPHILS 59 40 - 73 %    LYMPHOCYTES 30 21 - 52 %    MONOCYTES 8 3 - 10 %    EOSINOPHILS 2 0 - 5 %    BASOPHILS 1 0 - 2 %    ABS. NEUTROPHILS 3.4 1.8 - 8.0 K/UL    ABS. LYMPHOCYTES 1.8 0.9 - 3.6 K/UL    ABS. MONOCYTES 0.5 0.05 - 1.2 K/UL    ABS. EOSINOPHILS 0.1 0.0 - 0.4 K/UL    ABS. BASOPHILS 0.1 0.0 - 0.1 K/UL    DF AUTOMATED     LIPID PANEL   Result Value Ref Range    LIPID PROFILE          Cholesterol, total 250 (H) <200 MG/DL    Triglyceride 132 <150 MG/DL    HDL Cholesterol 83 (H) 40 - 60 MG/DL    LDL, calculated 140.6 (H) 0 - 100 MG/DL    VLDL, calculated 26.4 MG/DL    CHOL/HDL Ratio 3.0 0 - 5.0     METABOLIC PANEL, COMPREHENSIVE   Result Value Ref Range    Sodium 141 136 - 145 mmol/L    Potassium 4.3 3.5 - 5.5 mmol/L    Chloride 106 100 - 108 mmol/L    CO2 29 21 - 32 mmol/L    Anion gap 6 3.0 - 18 mmol/L    Glucose 84 74 - 99 mg/dL    BUN 20 (H) 7.0 - 18 MG/DL    Creatinine 0.59 (L) 0.6 - 1.3 MG/DL    BUN/Creatinine ratio 34 (H) 12 - 20      GFR est AA >60 >60 ml/min/1.73m2    GFR est non-AA >60 >60 ml/min/1.73m2    Calcium 8.9 8.5 - 10.1 MG/DL    Bilirubin, total 0.5 0.2 - 1.0 MG/DL    ALT (SGPT) 21 13 - 56 U/L    AST (SGOT) 16 15 - 37 U/L    Alk. phosphatase 58 45 - 117 U/L    Protein, total 6.8 6.4 - 8.2 g/dL    Albumin 3.8 3.4 - 5.0 g/dL    Globulin 3.0 2.0 - 4.0 g/dL    A-G Ratio 1.3 0.8 - 1.7     TSH 3RD GENERATION   Result Value Ref Range    TSH 1.50 0.36 - 3.74 uIU/mL     Assessment: #1.  Upper respiratory infection since yesterday consistent with a virus. Recommend she get some Sudafed or Mucinex D from behind the counter. #2.  Allergic rhinitis has been doing well. She will continue Zyrtec Flonase and Singulair. #3. Hyperlipidemia with high HDL cholesterol. Her risk from a lipid standpoint is low. She does not require vacation for this. She will continue very healthy diet.   #4. Tinnitus, told her there is no other treatment besides white noise that she is already doing with her family. #5.  TMJ on the right, hopefully the bite plate will help her, I told her there are not too many other options. Surgery is not a great option and was nonsteroidals do not help a whole lot for most people. Follow-up 1 year for complete evaluation or sooner if needed. She will receive PPSV 23 today. Clifton Seth MD FACP    Please note: This document has been produced using voice recognition software. Unrecognized errors in transcription may be present.

## 2018-07-20 ENCOUNTER — TELEPHONE (OUTPATIENT)
Dept: INTERNAL MEDICINE CLINIC | Age: 68
End: 2018-07-20

## 2018-07-20 RX ORDER — AZITHROMYCIN 250 MG/1
TABLET, FILM COATED ORAL
Qty: 6 TAB | Refills: 0 | Status: SHIPPED | OUTPATIENT
Start: 2018-07-20 | End: 2018-07-25

## 2018-07-20 NOTE — TELEPHONE ENCOUNTER
Pt was seen Monday- she is much worse-  Coughing up a lot of mucous,  Feels miserable, went to [facility] on Wednesday for her throat and they gave her a cough med- she has not been out of bed.  She wants to know if you can prescribe an antibiotic      [facility] on [address]

## 2018-11-19 ENCOUNTER — HOSPITAL ENCOUNTER (OUTPATIENT)
Dept: MAMMOGRAPHY | Age: 68
Discharge: HOME | End: 2018-11-19
Attending: OBSTETRICS & GYNECOLOGY
Payer: MEDICARE

## 2018-11-19 DIAGNOSIS — Z12.31 VISIT FOR SCREENING MAMMOGRAM: ICD-10-CM

## 2018-11-19 PROCEDURE — 77063 BREAST TOMOSYNTHESIS BI: CPT

## 2019-02-28 RX ORDER — MONTELUKAST SODIUM 10 MG/1
TABLET ORAL
Qty: 90 TAB | Refills: 2 | Status: SHIPPED | OUTPATIENT
Start: 2019-02-28 | End: 2019-07-22 | Stop reason: SDUPTHER

## 2019-07-01 RX ORDER — FLUTICASONE PROPIONATE 50 MCG
SPRAY, SUSPENSION (ML) NASAL
Qty: 48 G | Refills: 3 | Status: SHIPPED | OUTPATIENT
Start: 2019-07-01 | End: 2020-07-27 | Stop reason: SDUPTHER

## 2019-07-15 ENCOUNTER — HOSPITAL ENCOUNTER (OUTPATIENT)
Dept: LAB | Age: 69
Discharge: HOME | End: 2019-07-15
Payer: MEDICARE

## 2019-07-15 ENCOUNTER — LAB ONLY (OUTPATIENT)
Dept: INTERNAL MEDICINE CLINIC | Age: 69
End: 2019-07-15

## 2019-07-15 DIAGNOSIS — E78.5 HYPERLIPIDEMIA LDL GOAL <100: Primary | ICD-10-CM

## 2019-07-15 DIAGNOSIS — Z00.00 ROUTINE GENERAL MEDICAL EXAMINATION AT A HEALTH CARE FACILITY: ICD-10-CM

## 2019-07-15 DIAGNOSIS — M26.629 TMJ SYNDROME: ICD-10-CM

## 2019-07-15 DIAGNOSIS — E78.5 HYPERLIPIDEMIA LDL GOAL <100: ICD-10-CM

## 2019-07-15 LAB
ALBUMIN SERPL-MCNC: 3.9 G/DL (ref 3.4–5)
ALBUMIN/GLOB SERPL: 1.4 {RATIO} (ref 0.8–1.7)
ALP SERPL-CCNC: 61 U/L (ref 45–117)
ALT SERPL-CCNC: 23 U/L (ref 13–56)
ANION GAP SERPL CALC-SCNC: 5 MMOL/L (ref 3–18)
AST SERPL-CCNC: 17 U/L (ref 15–37)
BASOPHILS # BLD: 0.1 K/UL (ref 0–0.1)
BASOPHILS NFR BLD: 2 % (ref 0–2)
BILIRUB SERPL-MCNC: 0.4 MG/DL (ref 0.2–1)
BUN SERPL-MCNC: 23 MG/DL (ref 7–18)
BUN/CREAT SERPL: 37 (ref 12–20)
CALCIUM SERPL-MCNC: 9.3 MG/DL (ref 8.5–10.1)
CHLORIDE SERPL-SCNC: 104 MMOL/L (ref 100–108)
CHOLEST SERPL-MCNC: 277 MG/DL
CO2 SERPL-SCNC: 29 MMOL/L (ref 21–32)
CREAT SERPL-MCNC: 0.63 MG/DL (ref 0.6–1.3)
DIFFERENTIAL METHOD BLD: ABNORMAL
EOSINOPHIL # BLD: 0.1 K/UL (ref 0–0.4)
EOSINOPHIL NFR BLD: 2 % (ref 0–5)
ERYTHROCYTE [DISTWIDTH] IN BLOOD BY AUTOMATED COUNT: 14.1 % (ref 11.6–14.5)
GLOBULIN SER CALC-MCNC: 2.8 G/DL (ref 2–4)
GLUCOSE SERPL-MCNC: 95 MG/DL (ref 74–99)
HCT VFR BLD AUTO: 49.4 % (ref 35–45)
HDLC SERPL-MCNC: 83 MG/DL (ref 40–60)
HDLC SERPL: 3.3 {RATIO} (ref 0–5)
HGB BLD-MCNC: 15.5 G/DL (ref 12–16)
LDLC SERPL CALC-MCNC: 178.6 MG/DL (ref 0–100)
LIPID PROFILE,FLP: ABNORMAL
LYMPHOCYTES # BLD: 1.6 K/UL (ref 0.9–3.6)
LYMPHOCYTES NFR BLD: 30 % (ref 21–52)
MCH RBC QN AUTO: 30.1 PG (ref 24–34)
MCHC RBC AUTO-ENTMCNC: 31.4 G/DL (ref 31–37)
MCV RBC AUTO: 95.9 FL (ref 74–97)
MONOCYTES # BLD: 0.3 K/UL (ref 0.05–1.2)
MONOCYTES NFR BLD: 6 % (ref 3–10)
NEUTS SEG # BLD: 3.2 K/UL (ref 1.8–8)
NEUTS SEG NFR BLD: 60 % (ref 40–73)
PLATELET # BLD AUTO: 259 K/UL (ref 135–420)
PMV BLD AUTO: 10.9 FL (ref 9.2–11.8)
POTASSIUM SERPL-SCNC: 4.4 MMOL/L (ref 3.5–5.5)
PROT SERPL-MCNC: 6.7 G/DL (ref 6.4–8.2)
RBC # BLD AUTO: 5.15 M/UL (ref 4.2–5.3)
SODIUM SERPL-SCNC: 138 MMOL/L (ref 136–145)
TRIGL SERPL-MCNC: 77 MG/DL (ref ?–150)
TSH SERPL DL<=0.05 MIU/L-ACNC: 1.17 UIU/ML (ref 0.36–3.74)
VLDLC SERPL CALC-MCNC: 15.4 MG/DL
WBC # BLD AUTO: 5.3 K/UL (ref 4.6–13.2)

## 2019-07-15 PROCEDURE — 80053 COMPREHEN METABOLIC PANEL: CPT

## 2019-07-15 PROCEDURE — 36415 COLL VENOUS BLD VENIPUNCTURE: CPT

## 2019-07-15 PROCEDURE — 84443 ASSAY THYROID STIM HORMONE: CPT

## 2019-07-15 PROCEDURE — 85025 COMPLETE CBC W/AUTO DIFF WBC: CPT

## 2019-07-15 PROCEDURE — 80061 LIPID PANEL: CPT

## 2019-07-22 ENCOUNTER — OFFICE VISIT (OUTPATIENT)
Dept: INTERNAL MEDICINE CLINIC | Age: 69
End: 2019-07-22

## 2019-07-22 VITALS
HEART RATE: 67 BPM | OXYGEN SATURATION: 98 % | RESPIRATION RATE: 12 BRPM | HEIGHT: 68 IN | DIASTOLIC BLOOD PRESSURE: 70 MMHG | WEIGHT: 126 LBS | SYSTOLIC BLOOD PRESSURE: 108 MMHG | TEMPERATURE: 98.2 F | BODY MASS INDEX: 19.1 KG/M2

## 2019-07-22 DIAGNOSIS — E78.5 HYPERLIPIDEMIA LDL GOAL <100: Primary | ICD-10-CM

## 2019-07-22 DIAGNOSIS — G89.29 CHRONIC MIDLINE LOW BACK PAIN WITHOUT SCIATICA: ICD-10-CM

## 2019-07-22 DIAGNOSIS — J30.1 SEASONAL ALLERGIC RHINITIS DUE TO POLLEN: ICD-10-CM

## 2019-07-22 DIAGNOSIS — M54.50 CHRONIC MIDLINE LOW BACK PAIN WITHOUT SCIATICA: ICD-10-CM

## 2019-07-22 RX ORDER — CELECOXIB 200 MG/1
CAPSULE ORAL 2 TIMES DAILY
COMMUNITY
End: 2021-05-28

## 2019-07-22 RX ORDER — MONTELUKAST SODIUM 10 MG/1
10 TABLET ORAL DAILY
Qty: 90 TAB | Refills: 3 | Status: SHIPPED | OUTPATIENT
Start: 2019-07-22 | End: 2020-07-27 | Stop reason: SDUPTHER

## 2019-07-22 NOTE — PROGRESS NOTES
Leighann Dove 1950, is a 77 y.o. female, who is seen today for reevaluation of hyperlipidemia, chronic midline low back pain, allergic rhinitis. Allergies are doing well and she does need a refill on Singulair which helps her. She has chronic low back pain and is seen at the spine center periodically. She uses tramadol as needed. She has high cholesterol needs a very healthy diet and maintains a nice low weight. Past Medical History:   Diagnosis Date    Allergic rhinitis     Chronic pain     lower back    Hyperlipidemia     Raynaud phenomenon      Past Surgical History:   Procedure Laterality Date    COLONOSCOPY N/A 3/13/2017    COLONOSCOPY / polypectomy performed by Jaylyn Green MD at [facility] ENDOSCOPY    ENDOSCOPY, COLON, DIAGNOSTIC  2012     5 years, Dr. Fredrick Cantu HX COLONOSCOPY  3/14/12    2 mm tubular adenoma--Terell     COLONOSCOPY  03/13/2017    5mm polyp, 5 yrs, Dr. Edna Stuart HX HYSTERECTOMY      supracervical    HX OOPHORECTOMY      bilateral    HX TONSILLECTOMY       Current Outpatient Medications   Medication Sig Dispense Refill    celecoxib (CELEBREX) 200 mg capsule Take  by mouth two (2) times a day.  montelukast (SINGULAIR) 10 mg tablet Take 1 Tab by mouth daily. 90 Tab 3    fluticasone propionate (FLONASE) 50 mcg/actuation nasal spray instill 2 sprays into each nostril once daily 48 g 3    fluticasone (FLONASE) 50 mcg/actuation nasal spray instill 2 sprays into each nostril once daily 48 g 3    cetirizine (ZYRTEC) 10 mg tablet Take  by mouth daily.  estradiol (VIVELLE) 0.1 mg/24 hr 1 Patch by TransDERmal route every Monday.        Allergies   Allergen Reactions    Cephalosporins Other (comments)     Unable to take    Codeine Nausea and Vomiting    Penicillins Anaphylaxis     Social History     Socioeconomic History    Marital status:      Spouse name: Not on file    Number of children: Not on file    Years of education: Not on file    Highest education level: Not on file   Tobacco Use    Smoking status: Never Smoker    Smokeless tobacco: Never Used   Substance and Sexual Activity    Alcohol use: No    Drug use: No     Visit Vitals  /70 (BP 1 Location: Left arm, BP Patient Position: Sitting)   Pulse 67   Temp 98.2 °F (36.8 °C) (Oral)   Resp 12   Ht 5' 8\" (1.727 m)   Wt 126 lb (57.2 kg)   SpO2 98%   BMI 19.16 kg/m²     The patient is a well-developed well-nourished female in no apparent distress. HEENT: Pupils are equal and react to light and extraocular movements are full. Ear canals and tympanic membranes appear normal. Oral cavity appears normal with no oral lesions. Neck: Carotids are 2+ without bruits. No adenopathy or thyromegaly. Lungs are clear to percussion. I hear no wheezing, rales or rhonchi. Heart reveals a regular rhythm with no murmur, gallop, click or rub. The apical impulse is in the fifth interspace at the midclavicular line. Abdomen is soft and nontender with no hepatosplenomegaly or masses. Bowel sounds are normoactive and there is no distention or tympany. Extremities reveal no clubbing cyanosis or edema. Pulses are 2+. Skin reveals no suspicious skin growths. Breasts reveal no masses, skin or nipple abnormalities. No axillary adenopathy. Results for orders placed or performed during the hospital encounter of 07/15/19   CBC WITH AUTOMATED DIFF   Result Value Ref Range    WBC 5.3 4.6 - 13.2 K/uL    RBC 5.15 4.20 - 5.30 M/uL    HGB 15.5 12.0 - 16.0 g/dL    HCT 49.4 (H) 35.0 - 45.0 %    MCV 95.9 74.0 - 97.0 FL    MCH 30.1 24.0 - 34.0 PG    MCHC 31.4 31.0 - 37.0 g/dL    RDW 14.1 11.6 - 14.5 %    PLATELET 764 528 - 653 K/uL    MPV 10.9 9.2 - 11.8 FL    NEUTROPHILS 60 40 - 73 %    LYMPHOCYTES 30 21 - 52 %    MONOCYTES 6 3 - 10 %    EOSINOPHILS 2 0 - 5 %    BASOPHILS 2 0 - 2 %    ABS. NEUTROPHILS 3.2 1.8 - 8.0 K/UL    ABS. LYMPHOCYTES 1.6 0.9 - 3.6 K/UL    ABS.  MONOCYTES 0.3 0.05 - 1.2 K/UL    ABS. EOSINOPHILS 0.1 0.0 - 0.4 K/UL    ABS. BASOPHILS 0.1 0.0 - 0.1 K/UL    DF AUTOMATED     METABOLIC PANEL, COMPREHENSIVE   Result Value Ref Range    Sodium 138 136 - 145 mmol/L    Potassium 4.4 3.5 - 5.5 mmol/L    Chloride 104 100 - 108 mmol/L    CO2 29 21 - 32 mmol/L    Anion gap 5 3.0 - 18 mmol/L    Glucose 95 74 - 99 mg/dL    BUN 23 (H) 7.0 - 18 MG/DL    Creatinine 0.63 0.6 - 1.3 MG/DL    BUN/Creatinine ratio 37 (H) 12 - 20      GFR est AA >60 >60 ml/min/1.73m2    GFR est non-AA >60 >60 ml/min/1.73m2    Calcium 9.3 8.5 - 10.1 MG/DL    Bilirubin, total 0.4 0.2 - 1.0 MG/DL    ALT (SGPT) 23 13 - 56 U/L    AST (SGOT) 17 15 - 37 U/L    Alk. phosphatase 61 45 - 117 U/L    Protein, total 6.7 6.4 - 8.2 g/dL    Albumin 3.9 3.4 - 5.0 g/dL    Globulin 2.8 2.0 - 4.0 g/dL    A-G Ratio 1.4 0.8 - 1.7     LIPID PANEL   Result Value Ref Range    LIPID PROFILE          Cholesterol, total 277 (H) <200 MG/DL    Triglyceride 77 <150 MG/DL    HDL Cholesterol 83 (H) 40 - 60 MG/DL    LDL, calculated 178.6 (H) 0 - 100 MG/DL    VLDL, calculated 15.4 MG/DL    CHOL/HDL Ratio 3.3 0 - 5.0     TSH 3RD GENERATION   Result Value Ref Range    TSH 1.17 0.36 - 3.74 uIU/mL     Assessment: #1. Hyperlipidemia with cholesterol higher than usual currently. Her ten-year risk is still only 5.6%. We discussed this at some length and she will continue very healthy diet and maintain normal weight and is not a good candidate for statin therapy in view of the minimal improvement in 10-year risk. #2.  Allergic rhinitis doing well, she will continue Singulair 10 mg daily. #3.  Chronic lumbar pain without radiation, she will continue follow-up with the spine center and continue tramadol as needed. Follow-up 1 year for complete evaluation or sooner if needed    Clifton Hernández MD FACP    Please note: This document has been produced using voice recognition software. Unrecognized errors in transcription may be present.

## 2019-11-22 ENCOUNTER — HOSPITAL ENCOUNTER (OUTPATIENT)
Dept: MAMMOGRAPHY | Age: 69
Discharge: HOME | End: 2019-11-22
Attending: OBSTETRICS & GYNECOLOGY
Payer: MEDICARE

## 2019-11-22 DIAGNOSIS — Z12.31 VISIT FOR SCREENING MAMMOGRAM: ICD-10-CM

## 2019-11-22 PROCEDURE — 77063 BREAST TOMOSYNTHESIS BI: CPT

## 2020-04-13 ENCOUNTER — HOSPITAL ENCOUNTER (EMERGENCY)
Age: 70
Discharge: HOME | End: 2020-04-13
Attending: EMERGENCY MEDICINE
Payer: MEDICARE

## 2020-04-13 ENCOUNTER — APPOINTMENT (OUTPATIENT)
Dept: GENERAL RADIOLOGY | Age: 70
End: 2020-04-13
Attending: PHYSICIAN ASSISTANT
Payer: MEDICARE

## 2020-04-13 VITALS
DIASTOLIC BLOOD PRESSURE: 47 MMHG | OXYGEN SATURATION: 100 % | WEIGHT: 125 LBS | HEART RATE: 71 BPM | SYSTOLIC BLOOD PRESSURE: 122 MMHG | BODY MASS INDEX: 19.01 KG/M2 | TEMPERATURE: 98.1 F | RESPIRATION RATE: 12 BRPM

## 2020-04-13 DIAGNOSIS — S09.90XA INJURY OF HEAD, INITIAL ENCOUNTER: ICD-10-CM

## 2020-04-13 DIAGNOSIS — S01.81XA FACIAL LACERATION, INITIAL ENCOUNTER: Primary | ICD-10-CM

## 2020-04-13 PROCEDURE — 75810000293 HC SIMP/SUPERF WND  RPR

## 2020-04-13 PROCEDURE — 70160 X-RAY EXAM OF NASAL BONES: CPT

## 2020-04-13 PROCEDURE — 99283 EMERGENCY DEPT VISIT LOW MDM: CPT

## 2020-04-13 PROCEDURE — 74011000250 HC RX REV CODE- 250: Performed by: PHYSICIAN ASSISTANT

## 2020-04-13 RX ORDER — BACITRACIN ZINC 500 UNIT/G
OINTMENT (GRAM) TOPICAL
Status: COMPLETED | OUTPATIENT
Start: 2020-04-13 | End: 2020-04-13

## 2020-04-13 RX ADMIN — BACITRACIN ZINC: 500 OINTMENT TOPICAL at 16:19

## 2020-04-13 NOTE — ED PROVIDER NOTES
EMERGENCY DEPARTMENT HISTORY AND PHYSICAL EXAM    3:27 PM      Date: 4/13/2020  Patient Name: Henry Rausch    History of Presenting Illness     Chief Complaint   Patient presents with    Nasal Pain         History Provided By: Patient    Additional History (Context): Henry Rausch is a 72 y.o. female with noted PMH who presents with complaint of nasal pain and laceration that occurred just prior to arrival.  Patient notes a shelf fell off the wall and hit her nose. Denies loss of consciousness, dizziness, changes in vision, neck pain, epistaxis. Did not take any medication for symptoms prior to arrival.  Declining pain medication in the ED. Tetanus shot up-to-date. PCP: Mary Knowles MD    Current Outpatient Medications   Medication Sig Dispense Refill    celecoxib (CELEBREX) 200 mg capsule Take  by mouth two (2) times a day.  montelukast (SINGULAIR) 10 mg tablet Take 1 Tab by mouth daily. 90 Tab 3    fluticasone propionate (FLONASE) 50 mcg/actuation nasal spray instill 2 sprays into each nostril once daily 48 g 3    fluticasone (FLONASE) 50 mcg/actuation nasal spray instill 2 sprays into each nostril once daily 48 g 3    cetirizine (ZYRTEC) 10 mg tablet Take  by mouth daily.  estradiol (VIVELLE) 0.1 mg/24 hr 1 Patch by TransDERmal route every Monday.          Past History     Past Medical History:  Past Medical History:   Diagnosis Date    Allergic rhinitis     Chronic pain     lower back    Hyperlipidemia     Raynaud phenomenon        Past Surgical History:  Past Surgical History:   Procedure Laterality Date    COLONOSCOPY N/A 3/13/2017    COLONOSCOPY / polypectomy performed by Maia Hilario MD at [facility] ENDOSCOPY    ENDOSCOPY, COLON, DIAGNOSTIC  2012     5 years, Dr. Shruti Reed HX COLONOSCOPY  3/14/12    2 mm tubular adenoma--Terell     COLONOSCOPY  03/13/2017    5mm polyp, 5 yrs, Dr. Gloria Roberts supracervical    HX OOPHORECTOMY      bilateral    HX TONSILLECTOMY         Family History:  History reviewed. No pertinent family history. Social History:  Social History     Tobacco Use    Smoking status: Never Smoker    Smokeless tobacco: Never Used   Substance Use Topics    Alcohol use: No    Drug use: No       Allergies: Allergies   Allergen Reactions    Cephalosporins Other (comments)     Unable to take    Codeine Nausea and Vomiting    Penicillins Anaphylaxis         Review of Systems       Review of Systems   Constitutional: Negative for chills and fever. HENT:        Nasal pain     Respiratory: Negative for shortness of breath. Cardiovascular: Negative for chest pain. Gastrointestinal: Negative for abdominal pain, nausea and vomiting. Skin: Positive for wound. Negative for rash. Neurological: Negative for weakness. All other systems reviewed and are negative. Physical Exam     Visit Vitals  /47   Pulse 71   Temp 98.1 °F (36.7 °C)   Resp 12   Wt 56.7 kg (125 lb)   SpO2 100%   BMI 19.01 kg/m²         Physical Exam  Vitals signs and nursing note reviewed. Constitutional:       General: She is not in acute distress. Appearance: She is well-developed. She is not diaphoretic. HENT:      Head: Normocephalic. Laceration present. No raccoon eyes or Earl's sign. Jaw: There is normal jaw occlusion. Comments: No orbital step off      Right Ear: Tympanic membrane normal. No hemotympanum. Left Ear: Tympanic membrane normal. No hemotympanum. Nose:      Right Nostril: No epistaxis or septal hematoma. Left Nostril: No epistaxis or septal hematoma. Mouth/Throat:      Mouth: Mucous membranes are moist.   Eyes:      Pupils: Pupils are equal, round, and reactive to light. Neck:      Musculoskeletal: Normal range of motion and neck supple. Cardiovascular:      Rate and Rhythm: Normal rate and regular rhythm.       Heart sounds: Normal heart sounds. No murmur. No friction rub. No gallop. Pulmonary:      Effort: Pulmonary effort is normal. No respiratory distress. Breath sounds: Normal breath sounds. No wheezing or rales. Musculoskeletal: Normal range of motion. Skin:     General: Skin is warm. Findings: No rash. Neurological:      General: No focal deficit present. Mental Status: She is alert and oriented to person, place, and time. Mental status is at baseline. Cranial Nerves: No cranial nerve deficit. Sensory: No sensory deficit. Diagnostic Study Results     Labs -  No results found for this or any previous visit (from the past 12 hour(s)). Radiologic Studies -   XR NASAL BONES MIN 3 V   Final Result   Impression:   1. No definite nasal bone fracture. Medical Decision Making   I am the first provider for this patient. I reviewed the vital signs, available nursing notes, past medical history, past surgical history, family history and social history. Vital Signs-Reviewed the patient's vital signs. Records Reviewed: Nursing Notes and Old Medical Records (Time of Review: 3:27 PM)    ED Course: Progress Notes, Reevaluation, and Consults:  4:27 PM  Reviewed results with patient. Discussed need for close outpatient follow-up in 3-5 days for suture removal and for reassessment. Discussed strict return precautions, including vomiting, weakness, or any other medical concerns     Provider Notes (Medical Decision Making): 71-year-old female who presents to the ED due to nasal pain and laceration that occurred just prior to arrival.  Patient denies LOC, dizziness, change in vision, nausea or vomiting. Alert and oriented x3, no neurologic deficits on examination. No cervical midline tenderness. Facial x-ray demonstrates no evidence of nasal bone fracture. Laceration approximated with sutures. Tetanus up-to-date.   Stable for discharge with close outpatient follow-up for further assessment    Wound Repair  Date/Time: 4/13/2020 4:56 PM  [id number]: Dr. Bev Patino  Preparation: skin prepped with Betadine  Pre-procedure re-eval: Immediately prior to the procedure, the patient was reevaluated and found suitable for the planned procedure and any planned medications. Time out: Immediately prior to the procedure a time out was called to verify the correct patient, procedure, equipment, staff and marking as appropriate. .  Location details: nose  Wound length:2.5 cm or less  Anesthesia: local infiltration    Anesthesia:  Local Anesthetic: lidocaine 1% without epinephrine  Anesthetic total: 2 mL  Foreign bodies: no foreign bodies  Irrigation solution: saline  Irrigation method: jet lavage  Debridement: none  Skin closure: 6-0 nylon  Number of sutures: 2  Technique: simple  Approximation: close  Dressing: antibiotic ointment and 4x4  Patient tolerance: Patient tolerated the procedure well with no immediate complications  My total time at bedside, performing this procedure was 1-15 minutes. Diagnosis     Clinical Impression:   1. Facial laceration, initial encounter    2. Injury of head, initial encounter        Disposition: home     Follow-up Information     Follow up With Specialties Details Why [facility] EMERGENCY DEPT Emergency Medicine  If symptoms worsen [address] Brenda Beavers MD Internal Medicine In 5 days For suture removal [id number]  [phone]             Discharge Medication List as of 4/13/2020  4:22 PM      CONTINUE these medications which have NOT CHANGED    Details   celecoxib (CELEBREX) 200 mg capsule Take  by mouth two (2) times a day., Historical Med      montelukast (SINGULAIR) 10 mg tablet Take 1 Tab by mouth daily. , Normal, Disp-90 Tab, R-3      !! fluticasone propionate (FLONASE) 50 mcg/actuation nasal spray instill 2 sprays into each nostril once daily, Normal, Disp-48 g, R-3      !! fluticasone (FLONASE) 50 mcg/actuation nasal spray instill 2 sprays into each nostril once daily, No Print, Disp-48 g, R-3      cetirizine (ZYRTEC) 10 mg tablet Take  by mouth daily. , Historical Med      estradiol (VIVELLE) 0.1 mg/24 hr 1 Patch by TransDERmal route every Monday., Historical Med       !! - Potential duplicate medications found. Please discuss with provider. Dictation disclaimer:  Please note that this dictation was completed with [employer], the computer voice recognition software. Quite often unanticipated grammatical, syntax, homophones, and other interpretive errors are inadvertently transcribed by the computer software. Please disregard these errors. Please excuse any errors that have escaped final proofreading.

## 2020-04-13 NOTE — DISCHARGE INSTRUCTIONS
Patient Education        Patient Education       Follow-up with your primary care physician within 3-5 days for reassessment and suture removal. Bring the results from this visit with you for their review. Return to the ED immediately for any new, worsening, or persistent symptoms, including fever, vomiting, or any other medical concerns. Learning About a Closed Head Injury  What is a closed head injury? A closed head injury happens when your head gets hit hard. The strong force of the blow causes your brain to shake in your skull. This movement can cause the brain to bruise, swell, or tear. Sometimes nerves or blood vessels also get damaged. This can cause bleeding in or around the brain. A concussion is a type of closed head injury. What are the symptoms? If you have a mild concussion, you may have a mild headache or feel \"not quite right. \" These symptoms are common. They usually go away over a few days to 4 weeks. But sometimes after a concussion, you feel like you can't function as well as before the injury. And you have new symptoms. This is called postconcussive syndrome. You may:  · Find it harder to solve problems, think, concentrate, or remember. · Have headaches. · Have changes in your sleep patterns, such as not being able to sleep or sleeping all the time. · Have changes in your personality. · Not be interested in your usual activities. · Feel angry or anxious without a clear reason. · Lose your sense of taste or smell. · Be dizzy, lightheaded, or unsteady. It may be hard to stand or walk. How is a closed head injury treated? Any person who may have a concussion needs to see a doctor. Some people have to stay in the hospital to be watched. Others can go home safely. If you go home, follow your doctor's instructions. He or she will tell you if you need someone to watch you closely for the next 24 hours or longer. Rest is the best treatment. Get plenty of sleep at night.  And try to rest during the day. · Avoid activities that are physically or mentally demanding. These include housework, exercise, and schoolwork. And don't play video games, send text messages, or use the computer. You may need to change your school or work schedule to be able to avoid these activities. · Ask your doctor when it's okay to drive, ride a bike, or operate machinery. · Take an over-the-counter pain medicine, such as acetaminophen (Tylenol), ibuprofen (Advil, Motrin), or naproxen (Aleve). Be safe with medicines. Read and follow all instructions on the label. · Check with your doctor before you use any other medicines for pain. · Do not drink alcohol or use illegal drugs. They can slow recovery. They can also increase your risk of getting a second head injury. Follow-up care is a key part of your treatment and safety. Be sure to make and go to all appointments, and call your doctor if you are having problems. It's also a good idea to know your test results and keep a list of the medicines you take. Where can you learn more? Go to http://michelleFriendFitroro.info/  Enter E235 in the search box to learn more about \"Learning About a Closed Head Injury. \"  Current as of: November 19, 2019Content Version: 12.4  © 3931-3090 Healthwise, Incorporated. Care instructions adapted under license by [employer] (which disclaims liability or warranty for this information). If you have questions about a medical condition or this instruction, always ask your healthcare professional. [place] [address] any warranty or liability for your use of this information. Cuts on the Face Closed With Stitches: Care Instructions  Your Care Instructions  A cut on your face can be on your chin, cheek, nose, forehead, eyelid, lip, or ear. The doctor used stitches to close the cut. Using stitches helps the cut heal and reduces scarring.  The doctor may also have called in a specialist, such as a plastic surgeon, to close the cut. If the cut went deep and through the skin, the doctor may have put in two layers of stitches. The deeper layer brings the deep part of the cut together. These stitches will dissolve and don't need to be removed. The stitches in the upper layer are the ones you see on the cut. You will probably have a bandage. You will need to have the stitches removed, usually in 3 to 5 days. The doctor has checked you carefully, but problems can develop later. If you notice any problems or new symptoms, get medical treatment right away. Follow-up care is a key part of your treatment and safety. Be sure to make and go to all appointments, and call your doctor if you are having problems. It's also a good idea to know your test results and keep a list of the medicines you take. How can you care for yourself at home? · Keep the cut dry for the first 24 to 48 hours. After this, you can shower if your doctor okays it. Pat the cut dry. · Don't soak the cut, such as in a bathtub. Your doctor will tell you when it's safe to get the cut wet. · If your doctor told you how to care for your cut, follow your doctor's instructions. If you did not get instructions, follow this general advice:  ? After the first 24 to 48 hours, wash around the cut with clean water 2 times a day. Don't use hydrogen peroxide or alcohol, which can slow healing. ? You may cover the cut with a thin layer of petroleum jelly, such as Vaseline, and a nonstick bandage. ? Apply more petroleum jelly and replace the bandage as needed. · Avoid any activity that could cause your cut to reopen. · Do not remove the stitches on your own. Your doctor will tell you when to come back to have the stitches removed. · Be safe with medicines. Take pain medicines exactly as directed. ? If the doctor gave you a prescription medicine for pain, take it as prescribed.   ? If you are not taking a prescription pain medicine, ask your doctor if you can take an over-the-counter medicine. When should you call for help? Call your doctor now or seek immediate medical care if:    · You have new pain, or your pain gets worse.     · The skin near the cut is cold or pale or changes color.     · You have tingling, weakness, or numbness near the cut.     · The cut starts to bleed, and blood soaks through the bandage. Oozing small amounts of blood is normal.     · You have symptoms of infection, such as:  ? Increased pain, swelling, warmth, or redness around the cut.  ? Red streaks leading from the cut.  ? Pus draining from the cut.  ? A fever.    Watch closely for changes in your health, and be sure to contact your doctor if:    · You do not get better as expected. Where can you learn more? Go to http://michelle-roro.info/  Enter B870 in the search box to learn more about \"Cuts on the Face Closed With Stitches: Care Instructions. \"  Current as of: June 26, 2019Content Version: 12.4  © 1234-4541 Healthwise, Incorporated. Care instructions adapted under license by [employer] (which disclaims liability or warranty for this information). If you have questions about a medical condition or this instruction, always ask your healthcare professional. [address] any warranty or liability for your use of this information.

## 2020-07-20 ENCOUNTER — APPOINTMENT (OUTPATIENT)
Dept: INTERNAL MEDICINE CLINIC | Age: 70
End: 2020-07-20

## 2020-07-20 ENCOUNTER — HOSPITAL ENCOUNTER (OUTPATIENT)
Dept: LAB | Age: 70
Discharge: HOME | End: 2020-07-20
Payer: MEDICARE

## 2020-07-20 DIAGNOSIS — G89.29 CHRONIC MIDLINE LOW BACK PAIN WITHOUT SCIATICA: ICD-10-CM

## 2020-07-20 DIAGNOSIS — J30.1 SEASONAL ALLERGIC RHINITIS DUE TO POLLEN: ICD-10-CM

## 2020-07-20 DIAGNOSIS — M54.50 CHRONIC MIDLINE LOW BACK PAIN WITHOUT SCIATICA: ICD-10-CM

## 2020-07-20 DIAGNOSIS — E78.5 HYPERLIPIDEMIA LDL GOAL <100: ICD-10-CM

## 2020-07-20 LAB
ALBUMIN SERPL-MCNC: 3.5 G/DL (ref 3.4–5)
ALBUMIN/GLOB SERPL: 1.2 {RATIO} (ref 0.8–1.7)
ALP SERPL-CCNC: 45 U/L (ref 45–117)
ALT SERPL-CCNC: 22 U/L (ref 13–56)
ANION GAP SERPL CALC-SCNC: 5 MMOL/L (ref 3–18)
AST SERPL-CCNC: 15 U/L (ref 10–38)
BASOPHILS # BLD: 0.1 K/UL (ref 0–0.1)
BASOPHILS NFR BLD: 1 % (ref 0–2)
BILIRUB SERPL-MCNC: 0.4 MG/DL (ref 0.2–1)
BUN SERPL-MCNC: 19 MG/DL (ref 7–18)
BUN/CREAT SERPL: 32 (ref 12–20)
CALCIUM SERPL-MCNC: 9.5 MG/DL (ref 8.5–10.1)
CHLORIDE SERPL-SCNC: 104 MMOL/L (ref 100–111)
CHOLEST SERPL-MCNC: 280 MG/DL
CO2 SERPL-SCNC: 30 MMOL/L (ref 21–32)
CREAT SERPL-MCNC: 0.6 MG/DL (ref 0.6–1.3)
DIFFERENTIAL METHOD BLD: ABNORMAL
EOSINOPHIL # BLD: 0.3 K/UL (ref 0–0.4)
EOSINOPHIL NFR BLD: 4 % (ref 0–5)
ERYTHROCYTE [DISTWIDTH] IN BLOOD BY AUTOMATED COUNT: 14.8 % (ref 11.6–14.5)
GLOBULIN SER CALC-MCNC: 2.9 G/DL (ref 2–4)
GLUCOSE SERPL-MCNC: 76 MG/DL (ref 74–99)
HCT VFR BLD AUTO: 45.4 % (ref 35–45)
HDLC SERPL-MCNC: 87 MG/DL (ref 40–60)
HDLC SERPL: 3.2 {RATIO} (ref 0–5)
HGB BLD-MCNC: 14.6 G/DL (ref 12–16)
LDLC SERPL CALC-MCNC: 160.8 MG/DL (ref 0–100)
LIPID PROFILE,FLP: ABNORMAL
LYMPHOCYTES # BLD: 2.2 K/UL (ref 0.9–3.6)
LYMPHOCYTES NFR BLD: 35 % (ref 21–52)
MCH RBC QN AUTO: 30.9 PG (ref 24–34)
MCHC RBC AUTO-ENTMCNC: 32.2 G/DL (ref 31–37)
MCV RBC AUTO: 96 FL (ref 74–97)
MONOCYTES # BLD: 0.4 K/UL (ref 0.05–1.2)
MONOCYTES NFR BLD: 7 % (ref 3–10)
NEUTS SEG # BLD: 3.4 K/UL (ref 1.8–8)
NEUTS SEG NFR BLD: 53 % (ref 40–73)
PLATELET # BLD AUTO: 295 K/UL (ref 135–420)
PMV BLD AUTO: 10.5 FL (ref 9.2–11.8)
POTASSIUM SERPL-SCNC: 4.1 MMOL/L (ref 3.5–5.5)
PROT SERPL-MCNC: 6.4 G/DL (ref 6.4–8.2)
RBC # BLD AUTO: 4.73 M/UL (ref 4.2–5.3)
SODIUM SERPL-SCNC: 139 MMOL/L (ref 136–145)
T4 FREE SERPL-MCNC: 1.1 NG/DL (ref 0.7–1.5)
TRIGL SERPL-MCNC: 161 MG/DL (ref ?–150)
TSH SERPL DL<=0.05 MIU/L-ACNC: 1.68 UIU/ML (ref 0.36–3.74)
VLDLC SERPL CALC-MCNC: 32.2 MG/DL
WBC # BLD AUTO: 6.4 K/UL (ref 4.6–13.2)

## 2020-07-20 PROCEDURE — 85025 COMPLETE CBC W/AUTO DIFF WBC: CPT

## 2020-07-20 PROCEDURE — 36415 COLL VENOUS BLD VENIPUNCTURE: CPT

## 2020-07-20 PROCEDURE — 84439 ASSAY OF FREE THYROXINE: CPT

## 2020-07-20 PROCEDURE — 84443 ASSAY THYROID STIM HORMONE: CPT

## 2020-07-20 PROCEDURE — 80053 COMPREHEN METABOLIC PANEL: CPT

## 2020-07-20 PROCEDURE — 80061 LIPID PANEL: CPT

## 2020-07-27 ENCOUNTER — OFFICE VISIT (OUTPATIENT)
Dept: INTERNAL MEDICINE CLINIC | Age: 70
End: 2020-07-27

## 2020-07-27 VITALS
RESPIRATION RATE: 12 BRPM | HEART RATE: 68 BPM | WEIGHT: 122.6 LBS | HEIGHT: 68 IN | TEMPERATURE: 98 F | DIASTOLIC BLOOD PRESSURE: 70 MMHG | SYSTOLIC BLOOD PRESSURE: 120 MMHG | BODY MASS INDEX: 18.58 KG/M2

## 2020-07-27 DIAGNOSIS — J30.1 SEASONAL ALLERGIC RHINITIS DUE TO POLLEN: ICD-10-CM

## 2020-07-27 DIAGNOSIS — M54.50 CHRONIC MIDLINE LOW BACK PAIN WITHOUT SCIATICA: ICD-10-CM

## 2020-07-27 DIAGNOSIS — E78.5 HYPERLIPIDEMIA LDL GOAL <100: Primary | ICD-10-CM

## 2020-07-27 DIAGNOSIS — G89.29 CHRONIC MIDLINE LOW BACK PAIN WITHOUT SCIATICA: ICD-10-CM

## 2020-07-27 RX ORDER — FLUTICASONE PROPIONATE 50 MCG
SPRAY, SUSPENSION (ML) NASAL
Qty: 48 G | Refills: 3 | Status: SHIPPED | OUTPATIENT
Start: 2020-07-27 | End: 2021-08-18 | Stop reason: SDUPTHER

## 2020-07-27 RX ORDER — MONTELUKAST SODIUM 10 MG/1
10 TABLET ORAL DAILY
Qty: 90 TAB | Refills: 3 | Status: SHIPPED | OUTPATIENT
Start: 2020-07-27 | End: 2021-08-02 | Stop reason: SDUPTHER

## 2020-11-03 ENCOUNTER — TRANSCRIBE ORDER (OUTPATIENT)
Dept: SCHEDULING | Age: 70
End: 2020-11-03

## 2020-11-03 DIAGNOSIS — Z12.31 VISIT FOR SCREENING MAMMOGRAM: Primary | ICD-10-CM

## 2020-11-23 ENCOUNTER — HOSPITAL ENCOUNTER (OUTPATIENT)
Dept: MAMMOGRAPHY | Age: 70
Discharge: HOME | End: 2020-11-23
Attending: OBSTETRICS & GYNECOLOGY
Payer: MEDICARE

## 2020-11-23 DIAGNOSIS — Z12.31 VISIT FOR SCREENING MAMMOGRAM: ICD-10-CM

## 2020-11-23 PROCEDURE — 77063 BREAST TOMOSYNTHESIS BI: CPT

## 2020-12-24 ENCOUNTER — DOCUMENTATION ONLY (OUTPATIENT)
Dept: INTERNAL MEDICINE CLINIC | Age: 70
End: 2020-12-24

## 2020-12-24 RX ORDER — ESTRADIOL 10 UG/1
INSERT VAGINAL
Qty: 1 TAB | Refills: 0
Start: 2020-12-24

## 2020-12-24 RX ORDER — ESTRADIOL 0.1 MG/D
FILM, EXTENDED RELEASE TRANSDERMAL
Qty: 1 PATCH | Refills: 0
Start: 2020-12-24

## 2021-05-28 ENCOUNTER — HOSPITAL ENCOUNTER (EMERGENCY)
Age: 71
Discharge: HOME | End: 2021-05-28
Attending: EMERGENCY MEDICINE
Payer: MEDICARE

## 2021-05-28 ENCOUNTER — APPOINTMENT (OUTPATIENT)
Dept: CT IMAGING | Age: 71
End: 2021-05-28
Attending: EMERGENCY MEDICINE
Payer: MEDICARE

## 2021-05-28 VITALS
HEIGHT: 68 IN | TEMPERATURE: 98 F | SYSTOLIC BLOOD PRESSURE: 151 MMHG | HEART RATE: 77 BPM | OXYGEN SATURATION: 84 % | WEIGHT: 123 LBS | RESPIRATION RATE: 16 BRPM | BODY MASS INDEX: 18.64 KG/M2 | DIASTOLIC BLOOD PRESSURE: 60 MMHG

## 2021-05-28 DIAGNOSIS — N13.5 URETERAL OBSTRUCTION, RIGHT: Primary | ICD-10-CM

## 2021-05-28 DIAGNOSIS — N39.0 URINARY TRACT INFECTION WITHOUT HEMATURIA, SITE UNSPECIFIED: ICD-10-CM

## 2021-05-28 LAB
ALBUMIN SERPL-MCNC: 2.8 G/DL (ref 3.4–5)
ALBUMIN/GLOB SERPL: 1.2 {RATIO} (ref 0.8–1.7)
ALP SERPL-CCNC: 46 U/L (ref 45–117)
ALT SERPL-CCNC: 17 U/L (ref 13–56)
ANION GAP SERPL CALC-SCNC: 6 MMOL/L (ref 3–18)
APPEARANCE UR: CLEAR
AST SERPL-CCNC: 20 U/L (ref 10–38)
BASOPHILS # BLD: 0.1 K/UL (ref 0–0.1)
BASOPHILS NFR BLD: 1 % (ref 0–2)
BILIRUB SERPL-MCNC: 0.4 MG/DL (ref 0.2–1)
BILIRUB UR QL: NEGATIVE
BUN SERPL-MCNC: 20 MG/DL (ref 7–18)
BUN/CREAT SERPL: 41 (ref 12–20)
CALCIUM SERPL-MCNC: 7 MG/DL (ref 8.5–10.1)
CHLORIDE SERPL-SCNC: 115 MMOL/L (ref 100–111)
CO2 SERPL-SCNC: 23 MMOL/L (ref 21–32)
COLOR UR: YELLOW
CREAT SERPL-MCNC: 0.49 MG/DL (ref 0.6–1.3)
DIFFERENTIAL METHOD BLD: ABNORMAL
EOSINOPHIL # BLD: 0 K/UL (ref 0–0.4)
EOSINOPHIL NFR BLD: 0 % (ref 0–5)
EPITH CASTS URNS QL MICRO: ABNORMAL /LPF (ref 0–5)
ERYTHROCYTE [DISTWIDTH] IN BLOOD BY AUTOMATED COUNT: 13.5 % (ref 11.6–14.5)
GLOBULIN SER CALC-MCNC: 2.4 G/DL (ref 2–4)
GLUCOSE SERPL-MCNC: 92 MG/DL (ref 74–99)
GLUCOSE UR STRIP.AUTO-MCNC: NEGATIVE MG/DL
HCT VFR BLD AUTO: 43.3 % (ref 35–45)
HGB BLD-MCNC: 14.7 G/DL (ref 12–16)
HGB UR QL STRIP: NEGATIVE
KETONES UR QL STRIP.AUTO: 40 MG/DL
LEUKOCYTE ESTERASE UR QL STRIP.AUTO: ABNORMAL
LIPASE SERPL-CCNC: 120 U/L (ref 73–393)
LYMPHOCYTES # BLD: 0.9 K/UL (ref 0.9–3.6)
LYMPHOCYTES NFR BLD: 7 % (ref 21–52)
MCH RBC QN AUTO: 31.1 PG (ref 24–34)
MCHC RBC AUTO-ENTMCNC: 33.9 G/DL (ref 31–37)
MCV RBC AUTO: 91.5 FL (ref 74–97)
MONOCYTES # BLD: 0.3 K/UL (ref 0.05–1.2)
MONOCYTES NFR BLD: 2 % (ref 3–10)
MUCOUS THREADS URNS QL MICRO: ABNORMAL /LPF
NEUTS SEG # BLD: 11.4 K/UL (ref 1.8–8)
NEUTS SEG NFR BLD: 90 % (ref 40–73)
NITRITE UR QL STRIP.AUTO: NEGATIVE
PH UR STRIP: 5.5 [PH] (ref 5–8)
PLATELET # BLD AUTO: 273 K/UL (ref 135–420)
PMV BLD AUTO: 10.7 FL (ref 9.2–11.8)
POTASSIUM SERPL-SCNC: 3.4 MMOL/L (ref 3.5–5.5)
PROT SERPL-MCNC: 5.2 G/DL (ref 6.4–8.2)
PROT UR STRIP-MCNC: NEGATIVE MG/DL
RBC # BLD AUTO: 4.73 M/UL (ref 4.2–5.3)
RBC #/AREA URNS HPF: ABNORMAL /HPF (ref 0–5)
SODIUM SERPL-SCNC: 144 MMOL/L (ref 136–145)
SP GR UR REFRACTOMETRY: 1.02 (ref 1–1.03)
UROBILINOGEN UR QL STRIP.AUTO: 1 EU/DL (ref 0.2–1)
WBC # BLD AUTO: 12.7 K/UL (ref 4.6–13.2)
WBC URNS QL MICRO: ABNORMAL /HPF (ref 0–4)

## 2021-05-28 PROCEDURE — 74011250637 HC RX REV CODE- 250/637: Performed by: EMERGENCY MEDICINE

## 2021-05-28 PROCEDURE — 96374 THER/PROPH/DIAG INJ IV PUSH: CPT

## 2021-05-28 PROCEDURE — 74011250636 HC RX REV CODE- 250/636: Performed by: EMERGENCY MEDICINE

## 2021-05-28 PROCEDURE — 99285 EMERGENCY DEPT VISIT HI MDM: CPT

## 2021-05-28 PROCEDURE — 74011000636 HC RX REV CODE- 636: Performed by: EMERGENCY MEDICINE

## 2021-05-28 PROCEDURE — 83690 ASSAY OF LIPASE: CPT

## 2021-05-28 PROCEDURE — 85025 COMPLETE CBC W/AUTO DIFF WBC: CPT

## 2021-05-28 PROCEDURE — 87086 URINE CULTURE/COLONY COUNT: CPT

## 2021-05-28 PROCEDURE — 96375 TX/PRO/DX INJ NEW DRUG ADDON: CPT

## 2021-05-28 PROCEDURE — 81001 URINALYSIS AUTO W/SCOPE: CPT

## 2021-05-28 PROCEDURE — 80053 COMPREHEN METABOLIC PANEL: CPT

## 2021-05-28 PROCEDURE — 74177 CT ABD & PELVIS W/CONTRAST: CPT

## 2021-05-28 RX ORDER — SULFAMETHOXAZOLE AND TRIMETHOPRIM 800; 160 MG/1; MG/1
1 TABLET ORAL
Status: COMPLETED | OUTPATIENT
Start: 2021-05-28 | End: 2021-05-28

## 2021-05-28 RX ORDER — TAMSULOSIN HYDROCHLORIDE 0.4 MG/1
0.4 CAPSULE ORAL DAILY
Qty: 10 CAPSULE | Refills: 0 | Status: SHIPPED | OUTPATIENT
Start: 2021-05-28 | End: 2021-06-03

## 2021-05-28 RX ORDER — KETOROLAC TROMETHAMINE 15 MG/ML
15 INJECTION, SOLUTION INTRAMUSCULAR; INTRAVENOUS ONCE
Status: COMPLETED | OUTPATIENT
Start: 2021-05-28 | End: 2021-05-28

## 2021-05-28 RX ORDER — MORPHINE SULFATE 4 MG/ML
4 INJECTION INTRAVENOUS ONCE
Status: COMPLETED | OUTPATIENT
Start: 2021-05-28 | End: 2021-05-28

## 2021-05-28 RX ORDER — ONDANSETRON 4 MG/1
4 TABLET, ORALLY DISINTEGRATING ORAL
Qty: 20 TABLET | Refills: 0 | Status: SHIPPED | OUTPATIENT
Start: 2021-05-28 | End: 2021-08-18

## 2021-05-28 RX ORDER — HYDROMORPHONE HYDROCHLORIDE 1 MG/ML
0.5 INJECTION, SOLUTION INTRAMUSCULAR; INTRAVENOUS; SUBCUTANEOUS
Status: COMPLETED | OUTPATIENT
Start: 2021-05-28 | End: 2021-05-28

## 2021-05-28 RX ORDER — HYDROCODONE BITARTRATE AND ACETAMINOPHEN 5; 325 MG/1; MG/1
1 TABLET ORAL
Qty: 12 TABLET | Refills: 0 | Status: SHIPPED | OUTPATIENT
Start: 2021-05-28 | End: 2021-05-31

## 2021-05-28 RX ORDER — SULFAMETHOXAZOLE AND TRIMETHOPRIM 800; 160 MG/1; MG/1
1 TABLET ORAL 2 TIMES DAILY
Qty: 14 TABLET | Refills: 0 | Status: SHIPPED | OUTPATIENT
Start: 2021-05-28 | End: 2021-06-04

## 2021-05-28 RX ORDER — ONDANSETRON 2 MG/ML
4 INJECTION INTRAMUSCULAR; INTRAVENOUS ONCE
Status: COMPLETED | OUTPATIENT
Start: 2021-05-28 | End: 2021-05-28

## 2021-05-28 RX ADMIN — IOPAMIDOL 85 ML: 612 INJECTION, SOLUTION INTRAVENOUS at 13:11

## 2021-05-28 RX ADMIN — HYDROMORPHONE HYDROCHLORIDE 0.5 MG: 1 INJECTION, SOLUTION INTRAMUSCULAR; INTRAVENOUS; SUBCUTANEOUS at 13:18

## 2021-05-28 RX ADMIN — MORPHINE SULFATE 4 MG: 4 INJECTION, SOLUTION INTRAMUSCULAR; INTRAVENOUS at 12:09

## 2021-05-28 RX ADMIN — SODIUM CHLORIDE 1000 ML: 900 INJECTION, SOLUTION INTRAVENOUS at 12:09

## 2021-05-28 RX ADMIN — SULFAMETHOXAZOLE AND TRIMETHOPRIM 1 TABLET: 800; 160 TABLET ORAL at 16:25

## 2021-05-28 RX ADMIN — KETOROLAC TROMETHAMINE 15 MG: 15 INJECTION, SOLUTION INTRAMUSCULAR; INTRAVENOUS at 12:09

## 2021-05-28 RX ADMIN — ONDANSETRON 4 MG: 2 INJECTION INTRAMUSCULAR; INTRAVENOUS at 12:09

## 2021-05-28 NOTE — ED NOTES
Tommie Urena is a 80 y.o. female that was discharged in stable condition. The patients diagnosis, condition and treatment were explained to  patient and aftercare instructions were given. The patient verbalized understanding. Patient armband removed and shredded.

## 2021-05-28 NOTE — DISCHARGE INSTRUCTIONS
1.  Follow-up with urology. Take the antibiotics as prescribed in the meantime. 2.  Your cervix was abnormal in appearance on the CAT scan. Call your gynecologist as soon as possible to arrange a follow-up appointment, to have a Pap smear and have them evaluate your cervix. 3.  If you develop a fever, severe uncontrolled pain you should return.

## 2021-05-28 NOTE — ED TRIAGE NOTES
Patient c/o right flank pain that started this morning. She states the pain radiates around to her right lower quadrant. She states she has been unable to urinate and has a pressure sensation when she tries. She states it is a sharp pain in her lower back.

## 2021-05-28 NOTE — ED PROVIDER NOTES
EMERGENCY DEPARTMENT HISTORY AND PHYSICAL EXAM    11:46 AM    Date: 5/28/2021  Patient Name: Geneva Laboy    History of Presenting Illness     Chief Complaint   Patient presents with    Flank Pain    Abdominal Pain       History Provided By: Patient  Location/Duration/Severity/Modifying factors   Patient is a 67-year-old female with noted past medical history presenting to the emergency department with a chief complaint of right-sided abdominal pain and flank pain. Patient reports that it started abruptly at around 620 this morning after she woke up. Described as sharp, primarily in the right flank and low back and radiates to the right side of her abdomen. She reports that she is never had symptoms like this before reports somewhat similar symptoms when she had a prior UTI but that was more gradual in onset. She has associated nausea and dry heaving. She is not had any constipation or diarrhea. She denies any prior abdominal surgeries with exception of a total hysterectomy. Pain is rated as an 8 at the moment. She reports that yesterday she was feeling fine with no issues. In the recent days previously she has been feeling well as well. The patient denies any chest pain shortness of breath, she has had hot and cold sensation but no documented fevers or chills. Nothing in particular seems to make it better or worse. PCP: No primary care provider on file. Current Outpatient Medications   Medication Sig Dispense Refill    trimethoprim-sulfamethoxazole (Bactrim DS) 160-800 mg per tablet Take 1 Tablet by mouth two (2) times a day for 7 days. 14 Tablet 0    tamsulosin (Flomax) 0.4 mg capsule Take 1 Capsule by mouth daily for 10 days. 10 Capsule 0    HYDROcodone-acetaminophen (Norco) 5-325 mg per tablet Take 1 Tablet by mouth every six (6) hours as needed for Pain for up to 3 days. Max Daily Amount: 4 Tablets.  12 Tablet 0    ondansetron (Zofran ODT) 4 mg disintegrating tablet 1 Tablet by SubLINGual route every eight (8) hours as needed for Nausea or Vomiting. 20 Tablet 0    estradioL (Aury) 0.1 mg/24 hr Apply 1 patch 2 times a week 1 Patch 0    estradioL (VAGIFEM) 10 mcg tab vaginal tablet Insert 1 tablet by vaginal route twice weekly 1 Tab 0    fluticasone propionate (FLONASE) 50 mcg/actuation nasal spray instill 2 sprays into each nostril once daily 48 g 3    montelukast (SINGULAIR) 10 mg tablet Take 1 Tab by mouth daily. 90 Tab 3    cetirizine (ZYRTEC) 10 mg tablet Take  by mouth daily. Past History     Past Medical History:  Past Medical History:   Diagnosis Date    Allergic rhinitis     Chronic pain     lower back    Hyperlipidemia     Raynaud phenomenon        Past Surgical History:  Past Surgical History:   Procedure Laterality Date    COLONOSCOPY N/A 3/13/2017    COLONOSCOPY / polypect[facility] Keri Ewing [facility] END[facility]COPY    ENDOSCOPY, COLON, DIAGNOSTIC  2012     5 years, Dr. Jose Luis Phan HX COLONOSCOPY  3/14/12    [facility]    HX COLONOSCOPY  03/13/2017    5mm polyp, 5 yrs, Dr. Parish Aleman HX HYSTERECTOMY      supracervical    HX OOPHORECTOMY      bilateral    HX TONSILLECTOMY         Family History:  History reviewed. No pertinent family history. Social History:  Social History     Tobacco Use    Smoking status: Never Smoker    Smokeless tobacco: Never Used   Substance Use Topics    Alcohol use: No    Drug use: No       Allergies: Allergies   Allergen Reactions    Cephalosporins Other (comments)     Unable to take    Codeine Nausea and Vomiting    Penicillins Anaphylaxis       I reviewed and confirmed the above information with patient and updated as necessary. Review of Systems     Review of Systems   Constitutional: Negative for chills and fever. HENT: Negative for congestion, rhinorrhea, sinus pressure and sneezing. Eyes: Negative for visual disturbance.    Respiratory: Negative for cough and shortness of breath. Cardiovascular: Negative for chest pain. Gastrointestinal: Positive for abdominal pain, nausea and vomiting (Dry heaving). Negative for diarrhea. Genitourinary: Positive for difficulty urinating (Reports feels like she has to urinate but cannot). Negative for dysuria, frequency, hematuria, urgency, vaginal bleeding and vaginal discharge. Musculoskeletal: Negative for back pain, neck pain and neck stiffness. Skin: Negative for rash. Neurological: Negative for syncope, numbness and headaches. Physical Exam     Visit Vitals  BP (!) 151/60   Pulse 77   Temp 98 °F (36.7 °C)   Resp 16   Ht 5' 7.5\" (1.715 m)   Wt 55.8 kg (123 lb)   SpO2 (!) 84%   BMI 18.98 kg/m²       Physical Exam  Vitals and nursing note reviewed. Constitutional:       General: She is in acute distress (Uncomfortable appearing, nontoxic.). Appearance: Normal appearance. She is normal weight. HENT:      Head: Normocephalic and atraumatic. Right Ear: External ear normal.      Left Ear: External ear normal.      Nose: Nose normal.      Mouth/Throat:      Pharynx: No oropharyngeal exudate. Eyes:      General: No scleral icterus. Conjunctiva/sclera: Conjunctivae normal.      Pupils: Pupils are equal, round, and reactive to light. Cardiovascular:      Rate and Rhythm: Normal rate and regular rhythm. Pulses: Normal pulses. Heart sounds: Normal heart sounds. No murmur heard. Pulmonary:      Effort: Pulmonary effort is normal.      Breath sounds: Normal breath sounds. No wheezing, rhonchi or rales. Abdominal:      General: Abdomen is flat. Palpations: Abdomen is soft. Tenderness: There is no abdominal tenderness. There is no guarding or rebound. Hernia: No hernia is present. Comments: Really no elicited tenderness throughout the abdomen. Musculoskeletal:         General: No swelling or tenderness. Normal range of motion.       Cervical back: Normal range of motion and neck supple. Right lower leg: No edema. Left lower leg: No edema. Skin:     General: Skin is warm and dry. Capillary Refill: Capillary refill takes less than 2 seconds. Findings: No rash. Neurological:      General: No focal deficit present. Mental Status: She is alert. Psychiatric:         Mood and Affect: Mood normal.         Behavior: Behavior normal.         Diagnostic Study Results     Labs -  Recent Results (from the past 24 hour(s))   CBC WITH AUTOMATED DIFF    Collection Time: 05/28/21 11:49 AM   Result Value Ref Range    WBC 12.7 4.6 - 13.2 K/uL    RBC 4.73 4.20 - 5.30 M/uL    HGB 14.7 12.0 - 16.0 g/dL    HCT 43.3 35.0 - 45.0 %    MCV 91.5 74.0 - 97.0 FL    MCH 31.1 24.0 - 34.0 PG    MCHC 33.9 31.0 - 37.0 g/dL    RDW 13.5 11.6 - 14.5 %    PLATELET 143 133 - 426 K/uL    MPV 10.7 9.2 - 11.8 FL    NEUTROPHILS 90 (H) 40 - 73 %    LYMPHOCYTES 7 (L) 21 - 52 %    MONOCYTES 2 (L) 3 - 10 %    EOSINOPHILS 0 0 - 5 %    BASOPHILS 1 0 - 2 %    ABS. NEUTROPHILS 11.4 (H) 1.8 - 8.0 K/UL    ABS. LYMPHOCYTES 0.9 0.9 - 3.6 K/UL    ABS. MONOCYTES 0.3 0.05 - 1.2 K/UL    ABS. EOSINOPHILS 0.0 0.0 - 0.4 K/UL    ABS. BASOPHILS 0.1 0.0 - 0.1 K/UL    DF AUTOMATED     METABOLIC PANEL, COMPREHENSIVE    Collection Time: 05/28/21 11:49 AM   Result Value Ref Range    Sodium 144 136 - 145 mmol/L    Potassium 3.4 (L) 3.5 - 5.5 mmol/L    Chloride 115 (H) 100 - 111 mmol/L    CO2 23 21 - 32 mmol/L    Anion gap 6 3.0 - 18 mmol/L    Glucose 92 74 - 99 mg/dL    BUN 20 (H) 7.0 - 18 MG/DL    Creatinine 0.49 (L) 0.6 - 1.3 MG/DL    BUN/Creatinine ratio 41 (H) 12 - 20      GFR est AA >60 >60 ml/min/1.73m2    GFR est non-AA >60 >60 ml/min/1.73m2    Calcium 7.0 (L) 8.5 - 10.1 MG/DL    Bilirubin, total 0.4 0.2 - 1.0 MG/DL    ALT (SGPT) 17 13 - 56 U/L    AST (SGOT) 20 10 - 38 U/L    Alk.  phosphatase 46 45 - 117 U/L    Protein, total 5.2 (L) 6.4 - 8.2 g/dL    Albumin 2.8 (L) 3.4 - 5.0 g/dL Globulin 2.4 2.0 - 4.0 g/dL    A-G Ratio 1.2 0.8 - 1.7     LIPASE    Collection Time: 05/28/21 11:49 AM   Result Value Ref Range    Lipase 120 73 - 393 U/L   URINALYSIS W/ RFLX MICROSCOPIC    Collection Time: 05/28/21 12:01 PM   Result Value Ref Range    Color YELLOW      Appearance CLEAR      Specific gravity 1.021 1.005 - 1.030      pH (UA) 5.5 5.0 - 8.0      Protein Negative NEG mg/dL    Glucose Negative NEG mg/dL    Ketone 40 (A) NEG mg/dL    Bilirubin Negative NEG      Blood Negative NEG      Urobilinogen 1.0 0.2 - 1.0 EU/dL    Nitrites Negative NEG      Leukocyte Esterase MODERATE (A) NEG     URINE MICROSCOPIC ONLY    Collection Time: 05/28/21 12:01 PM   Result Value Ref Range    WBC 11 to 20 0 - 4 /hpf    RBC NONE 0 - 5 /hpf    Epithelial cells 2+ 0 - 5 /lpf    Mucus 1+ (A) NEG /lpf         Radiologic Studies -   CT ABD PELV W CONT   Final Result      There are bilateral intrarenal calculi. There is right decreased nephrogram and dilation of the collecting system down   to the deep pelvis. A pelvic calcification possibly representing a distal   ureteral calculus is present on the right. Noncalculous obstruction is a greater   concern. Delayed images 30 at 45 minutes following initial imaging revealed no   progression of contrast into the ureter from the dilated renal pelvis. Noncalculous obstruction is suspect (see below). 4 hour follow-up noncontrast CT   of the abdomen and pelvis be considered to further assess the right ureter. The cervix is abnormal. The size is increased and there is dilation of the    endocervical canal. Cystic areas are present possibly secondary to the dilated   canal. Cervical mass is a strong possibility with this appearance. Direct   inspection of the cervix is recommended. Biopsy considered.  Optimal display of   the anatomy would be MRI without and with contrast      All CT scans at this facility are performed using dose optimization technique as   appropriate to the performed exam, to include automated exposure control,   adjustment of the mA and/or kV according to patient's size (Including   appropriate matching for site-specific examinations), or use of iterative   reconstruction technique. Medical Decision Making   I am the first provider for this patient. I reviewed the vital signs, available nursing notes, past medical history, past surgical history, family history and social history. Vital Signs-Reviewed the patient's vital signs. Records Reviewed: Nursing Notes, Old Medical Records, Previous Radiology Studies and Previous Laboratory Studies (Time of Review: 11:46 AM)    ED Course: Progress Notes, Reevaluation, and Consults:  ED Course as of May 28 2116   Fri May 28, 2021   1332 1:33 PM Patient reassessed by me. Patient reports improvement after recent dose of Dilaudid. Updated on progress and results thus far and plan, as well as outstanding or pending results. [DANA]   1731 Noted CT scan result, urology paged. [DANA]   4345 Patient updated with the results of the CAT scan. She reports that she had a Pap smear done last year and a gynecologic exam was told it was normal.    [DANA]   1761 I have paged urology, the case was discussed with the midlevel provider who is on call and she will review the images and discuss the case with the attending to discuss the next steps. [DANA]   6880 Case again discussed with urology. They recommended outpatient follow-up to believe that this is likely caused by a stone. Recommended placing the patient on Bactrim.     [DANA]      ED Course User Index  [DANA] Tesfaye Vazquez DO         Provider Notes (Medical Decision Making):   MDM  Number of Diagnoses or Management Options  Ureteral obstruction, right  Urinary tract infection without hematuria, site unspecified  Diagnosis management comments: Patient is a 41-year-old female who presents to the emergency department with chief complaint of abdominal pain and flank pain. The differential diagnosis would include kidney stone chiefly, also consider appendicitis, bowel obstruction, cecal volvulus, pyelonephritis, gastritis, pancreatitis, etc.    Get labs, urine as well as a CT abdomen pelvis to rule out above pathology. Patient's results were abnormal.  The patient has what seems to be an obstruction to her right ureter, no clearly hydronephrosis. Due to contrast administration it was difficult to see if there was a stone or not causing the obstruction. Also noted that the cervix was abnormal.  Question of cervical mass. The patient was advised of the findings, she told me she had a normal Pap smear done in December which was only 6 months ago. She had reportedly normal exam at that time by her GYN. The patient's case and findings were discussed with the urology team on call. They reviewed the images and provide recommendations that the patient can be discharged home provided that her pain could be controlled. They felt it was probably a stone, and this is consistent with her presentation and fortunately her pain seems to be relieved now her urinary symptoms are gone. They recommended follow-up in outpatient clinic next week. Recommended Bactrim for UTI, as well as pain control and Flomax. I will send prescriptions for these medications, Norco, Bactrim and Flomax. Patient was updated with the findings. She will plan to call her GYN today. I will give her a copy of the images so that she can take him to the GYN office if they cannot access the images on the cloud.     Recommended to return if pain returns, it is worse if she is having fever or any worsening in her condition in general.    At this time, patient is stable and appropriate for discharge home.  Patient demonstrates understanding of current diagnoses and is in agreement with the treatment plan. Niels Briones are advised that while the likelihood of serious underlying condition is low at this point given the evaluation performed today, we cannot fully rule it out. Noah Cough are advised to immediately return with any new symptoms or worsening of current condition.  All questions have been answered. Danielle Wiley is given educational material regarding their diagnoses, including danger symptoms and when to return to the ED. This note was dictated utilizing Dragon voice recognition software. Unfortunately this leads to occasional typographical errors. I apologize in advance if the situation occurs. If questions occur please do not hesitate to contact me directly. Jose J Salas,           Procedures    Critical Care Time: 0    Diagnosis     Clinical Impression:   1. Ureteral obstruction, right    2. Urinary tract infection without hematuria, site unspecified        Disposition: Discharge    Follow-up Information     Follow up With Specialties Details Why [place] [address] EMERGENCY DEPT Emergency Medicine  As needed, If symptoms worsen [address] Yaima Madsen MD Urology In 3 days You should be seen next week. Call on Monday or Tuesday for a follow-up appointment to ensure they have you on the schedule. [facility]      Mayuri Loo MD Obstetrics & Gynecology In 3 days Evaluation of your cervix. [address] [facility]  [phone]             Discharge Medication List as of 5/28/2021  4:09 PM      START taking these medications    Details   trimethoprim-sulfamethoxazole (Bactrim DS) 160-800 mg per tablet Take 1 Tablet by mouth two (2) times a day for 7 days. , Normal, Disp-14 Tablet, R-0      tamsulosin (Flomax) 0.4 mg capsule Take 1 Capsule by mouth daily for 10 days. , Normal, Disp-10 Capsule, R-0      HYDROcodone-acetaminophen (Norco) 5-325 mg per tablet Take 1 Tablet by mouth every six (6) hours as needed for Pain for up to 3 days.  Max Daily Amount: 4 Tablets., Normal, Disp-12 Tablet, R-0      ondansetron (Zofran ODT) 4 mg disintegrating tablet 1 Tablet by SubLINGual route every eight (8) hours as needed for Nausea or Vomiting., Normal, Disp-20 Tablet, R-0         CONTINUE these medications which have NOT CHANGED    Details   estradioL (Aury) 0.1 mg/24 hr Apply 1 patch 2 times a week, No Print, Disp-1 Patch, R-0Prescribed by Dr. Tani Weaver, GYN      estradioL (VAGIFEM) 10 mcg tab vaginal tablet Insert 1 tablet by vaginal route twice weekly, No Print, Disp-1 Tab, R-0Prescribed by Dr. Tani Weaver, GYN      fluticasone propionate (FLONASE) 50 mcg/actuation nasal spray instill 2 sprays into each nostril once daily, Normal, Disp-48 g,R-3      montelukast (SINGULAIR) 10 mg tablet Take 1 Tab by mouth daily. , Normal, Disp-90 Tab,R-3      cetirizine (ZYRTEC) 10 mg tablet Take  by mouth daily. , [facility] [place]    Emergency Medicine   May 28, 2021, 11:46 AM     This note is dictated utilizing Dragon voice recognition software. Unfortunately this leads to occasional typographical errors using the voice recognition. I apologize in advance if the situation occurs. If questions occur please do not hesitate to contact me directly.     Lory Marina, DO

## 2021-05-29 LAB
BACTERIA SPEC CULT: NORMAL
SERVICE CMNT-IMP: NORMAL

## 2021-06-25 PROBLEM — N20.0 RENAL CALCULUS: Status: ACTIVE | Noted: 2021-06-25

## 2021-07-26 ENCOUNTER — APPOINTMENT (OUTPATIENT)
Dept: INTERNAL MEDICINE CLINIC | Age: 71
End: 2021-07-26

## 2021-07-26 ENCOUNTER — HOSPITAL ENCOUNTER (OUTPATIENT)
Dept: LAB | Age: 71
Discharge: HOME | End: 2021-07-26
Payer: MEDICARE

## 2021-07-26 DIAGNOSIS — E78.2 MIXED HYPERLIPIDEMIA: ICD-10-CM

## 2021-07-26 DIAGNOSIS — E87.6 HYPOKALEMIA: Primary | ICD-10-CM

## 2021-07-26 DIAGNOSIS — E87.6 HYPOKALEMIA: ICD-10-CM

## 2021-07-26 DIAGNOSIS — E83.51 HYPOCALCEMIA: ICD-10-CM

## 2021-07-26 LAB
ALBUMIN SERPL-MCNC: 4 G/DL (ref 3.4–5)
ALBUMIN/GLOB SERPL: 1.4 {RATIO} (ref 0.8–1.7)
ALP SERPL-CCNC: 57 U/L (ref 45–117)
ALT SERPL-CCNC: 23 U/L (ref 13–56)
ANION GAP SERPL CALC-SCNC: 5 MMOL/L (ref 3–18)
AST SERPL-CCNC: 18 U/L (ref 10–38)
BILIRUB SERPL-MCNC: 0.4 MG/DL (ref 0.2–1)
BUN SERPL-MCNC: 27 MG/DL (ref 7–18)
BUN/CREAT SERPL: 48 (ref 12–20)
CALCIUM SERPL-MCNC: 9.8 MG/DL (ref 8.5–10.1)
CHLORIDE SERPL-SCNC: 107 MMOL/L (ref 100–111)
CHOLEST SERPL-MCNC: 275 MG/DL
CO2 SERPL-SCNC: 28 MMOL/L (ref 21–32)
CREAT SERPL-MCNC: 0.56 MG/DL (ref 0.6–1.3)
GLOBULIN SER CALC-MCNC: 2.8 G/DL (ref 2–4)
GLUCOSE SERPL-MCNC: 86 MG/DL (ref 74–99)
HDLC SERPL-MCNC: 87 MG/DL (ref 40–60)
HDLC SERPL: 3.2 {RATIO} (ref 0–5)
LDLC SERPL CALC-MCNC: 171.4 MG/DL (ref 0–100)
LIPID PROFILE,FLP: ABNORMAL
POTASSIUM SERPL-SCNC: 4.4 MMOL/L (ref 3.5–5.5)
PROT SERPL-MCNC: 6.8 G/DL (ref 6.4–8.2)
SODIUM SERPL-SCNC: 140 MMOL/L (ref 136–145)
TRIGL SERPL-MCNC: 83 MG/DL (ref ?–150)
VLDLC SERPL CALC-MCNC: 16.6 MG/DL

## 2021-07-26 PROCEDURE — 80053 COMPREHEN METABOLIC PANEL: CPT

## 2021-07-26 PROCEDURE — 36415 COLL VENOUS BLD VENIPUNCTURE: CPT

## 2021-07-26 PROCEDURE — 80061 LIPID PANEL: CPT

## 2021-07-26 NOTE — PROGRESS NOTES
ICD-10-CM ICD-9-CM    1. Hypokalemia  Q55.9 245.1 METABOLIC PANEL, COMPREHENSIVE   2. Hypocalcemia  F51.76 794.11 METABOLIC PANEL, COMPREHENSIVE   3.  Mixed hyperlipidemia  E78.2 272.2 LIPID PANEL     Upcoming appt 8/18/21

## 2021-08-02 RX ORDER — MONTELUKAST SODIUM 10 MG/1
10 TABLET ORAL DAILY
Qty: 30 TABLET | Refills: 0 | Status: SHIPPED | OUTPATIENT
Start: 2021-08-02 | End: 2021-08-18 | Stop reason: SDUPTHER

## 2021-08-18 ENCOUNTER — OFFICE VISIT (OUTPATIENT)
Dept: INTERNAL MEDICINE CLINIC | Age: 71
End: 2021-08-18
Payer: MEDICARE

## 2021-08-18 VITALS
OXYGEN SATURATION: 99 % | TEMPERATURE: 98 F | HEART RATE: 73 BPM | SYSTOLIC BLOOD PRESSURE: 117 MMHG | HEIGHT: 68 IN | WEIGHT: 123.8 LBS | RESPIRATION RATE: 18 BRPM | DIASTOLIC BLOOD PRESSURE: 68 MMHG | BODY MASS INDEX: 18.76 KG/M2

## 2021-08-18 DIAGNOSIS — Z76.89 ENCOUNTER TO ESTABLISH CARE WITH NEW DOCTOR: ICD-10-CM

## 2021-08-18 DIAGNOSIS — N95.1 MENOPAUSAL SYMPTOMS: ICD-10-CM

## 2021-08-18 DIAGNOSIS — J30.1 SEASONAL ALLERGIC RHINITIS DUE TO POLLEN: ICD-10-CM

## 2021-08-18 DIAGNOSIS — M15.9 PRIMARY OSTEOARTHRITIS INVOLVING MULTIPLE JOINTS: ICD-10-CM

## 2021-08-18 DIAGNOSIS — G89.29 CHRONIC MIDLINE LOW BACK PAIN WITHOUT SCIATICA: ICD-10-CM

## 2021-08-18 DIAGNOSIS — Z87.442 HISTORY OF KIDNEY STONES: ICD-10-CM

## 2021-08-18 DIAGNOSIS — Z71.89 ADVANCED DIRECTIVES, COUNSELING/DISCUSSION: ICD-10-CM

## 2021-08-18 DIAGNOSIS — Z00.00 MEDICARE ANNUAL WELLNESS VISIT, SUBSEQUENT: Primary | ICD-10-CM

## 2021-08-18 DIAGNOSIS — M54.50 CHRONIC MIDLINE LOW BACK PAIN WITHOUT SCIATICA: ICD-10-CM

## 2021-08-18 DIAGNOSIS — E78.5 HYPERLIPIDEMIA LDL GOAL <100: ICD-10-CM

## 2021-08-18 PROCEDURE — G8536 NO DOC ELDER MAL SCRN: HCPCS | Performed by: NURSE PRACTITIONER

## 2021-08-18 PROCEDURE — G8427 DOCREV CUR MEDS BY ELIG CLIN: HCPCS | Performed by: NURSE PRACTITIONER

## 2021-08-18 PROCEDURE — 1090F PRES/ABSN URINE INCON ASSESS: CPT | Performed by: NURSE PRACTITIONER

## 2021-08-18 PROCEDURE — G8432 DEP SCR NOT DOC, RNG: HCPCS | Performed by: NURSE PRACTITIONER

## 2021-08-18 PROCEDURE — G8399 PT W/DXA RESULTS DOCUMENT: HCPCS | Performed by: NURSE PRACTITIONER

## 2021-08-18 PROCEDURE — 99214 OFFICE O/P EST MOD 30 MIN: CPT | Performed by: NURSE PRACTITIONER

## 2021-08-18 PROCEDURE — G9899 SCRN MAM PERF RSLTS DOC: HCPCS | Performed by: NURSE PRACTITIONER

## 2021-08-18 PROCEDURE — 1101F PT FALLS ASSESS-DOCD LE1/YR: CPT | Performed by: NURSE PRACTITIONER

## 2021-08-18 PROCEDURE — 99497 ADVNCD CARE PLAN 30 MIN: CPT | Performed by: NURSE PRACTITIONER

## 2021-08-18 PROCEDURE — G0463 HOSPITAL OUTPT CLINIC VISIT: HCPCS | Performed by: NURSE PRACTITIONER

## 2021-08-18 PROCEDURE — G8420 CALC BMI NORM PARAMETERS: HCPCS | Performed by: NURSE PRACTITIONER

## 2021-08-18 PROCEDURE — 3017F COLORECTAL CA SCREEN DOC REV: CPT | Performed by: NURSE PRACTITIONER

## 2021-08-18 PROCEDURE — G0439 PPPS, SUBSEQ VISIT: HCPCS | Performed by: NURSE PRACTITIONER

## 2021-08-18 RX ORDER — MONTELUKAST SODIUM 10 MG/1
10 TABLET ORAL DAILY
Qty: 90 TABLET | Refills: 3 | Status: SHIPPED | OUTPATIENT
Start: 2021-08-18 | End: 2022-08-19 | Stop reason: SDUPTHER

## 2021-08-18 RX ORDER — FLUTICASONE PROPIONATE 50 MCG
2 SPRAY, SUSPENSION (ML) NASAL
Qty: 1 BOTTLE | Refills: 5 | Status: SHIPPED | OUTPATIENT
Start: 2021-08-18 | End: 2022-04-13

## 2021-08-18 NOTE — PROGRESS NOTES
Chief Complaint   Patient presents with   [address] [facility] Annual Wellness Visit       1. Have you been to the ER, urgent care clinic since your last visit? Hospitalized since your last visit? May 2021 for kidney stone    2. Have you seen or consulted any other health care providers outside of the [address] [place] [address] since your last visit? Include any pap smears or colon screening. Yes, [facility].

## 2021-08-18 NOTE — PROGRESS NOTES
This is the Subsequent Medicare Annual Wellness Exam, performed 12 months or more after the Initial AWV or the last Subsequent AWV    I have reviewed the patient's medical history in detail and updated the computerized patient record. Assessment/Plan   Education and counseling provided:  Are appropriate based on today's review and evaluation  Overall patient is doing well   Shingrix vaccine    1. Medicare annual wellness visit, subsequent  -     varicella-zoster recombinant, PF, (Shingrix, PF,) 50 mcg/0.5 mL susr injection; 0.5mL by IntraMUSCular route once now and then repeat in 2-6 months, No Print, Disp-0.5 mL, R-1  2. Advanced directives, counseling/discussion  -     ADVANCE CARE PLANNING FIRST 30 MINS  -     FULL CODE  3. Encounter to establish care with new doctor  4. Hyperlipidemia LDL goal <100  5. Seasonal allergic rhinitis due to pollen  -     montelukast (SINGULAIR) 10 mg tablet; Take 1 Tablet by mouth daily. , Normal, Disp-90 Tablet, R-3  -     fluticasone propionate (FLONASE) 50 mcg/actuation nasal spray; 2 Sprays by Both Nostrils route daily as needed for Allergies. Indications: inflammation of the nose due to an allergy, Normal, Disp-1 Bottle, R-5  6. History of kidney stones  7. Primary osteoarthritis involving multiple joints  8. Chronic midline low back pain without sciatica  9. Menopausal symptoms       Depression Risk Factor Screening     3 most recent PHQ Screens 8/18/2021   Little interest or pleasure in doing things Not at all   Feeling down, depressed, irritable, or hopeless Not at all   Total Score PHQ 2 0       Alcohol Risk Screen    Do you average more than 1 drink per night or more than 7 drinks a week:  No    On any one occasion in the past three months have you have had more than 3 drinks containing alcohol:  No        Functional Ability and Level of Safety    Hearing: Hearing is good. Activities of Daily Living:   The home contains: grab bars  Patient does total self care Ambulation: with no difficulty     Fall Risk:  Fall Risk Assessment, last 12 mths 8/18/2021   Able to walk? Yes   Fall in past 12 months? 0   Do you feel unsteady?  0   Are you worried about falling 0      Abuse Screen:  Patient is not abused       Cognitive Screening    Has your family/caregiver stated any concerns about your memory: no     Cognitive Screening: Normal - Clock Drawing Test    Health Maintenance Due     Health Maintenance Due   Topic Date Due    Shingrix Vaccine Age 49> (1 of 2) Never done       Patient Care Team   Patient Care Team:  Tanja Olivier DNP as PCP - General (Nurse Practitioner)  Tanja Olivier DNP as PCP - [facility] [place] [facility]  Emilio Urban MD (Obstetrics & Gynecology)    History     Patient Active Problem List   Diagnosis Code    Raynaud's disease I73.00    Sciatica M54.30    Primary osteoarthritis involving multiple joints M89.49    Hyperlipidemia LDL goal <100 E78.5    Seasonal allergic rhinitis due to pollen J30.1    Chronic midline low back pain without sciatica M54.5, G89.29    TMJ syndrome M26.629    Renal calculus N20.0     Past Medical History:   Diagnosis Date    Allergic rhinitis     Chronic pain     lower back    Hyperlipidemia     Raynaud phenomenon       Past Surgical History:   Procedure Laterality Date    COLONOSCOPY N/A 3/13/2017    COLONOSCOPY / polypectomy performed by Luis nErique Salazar MD at [facility] ENDOSCOPY    ENDOSCOPY, COLON, DIAGNOSTIC  2012     5 years, Dr. Ananth Varma HX COLONOSCOPY  3/14/12    2 mm tubular adenoma--[facility]    HX COLONOSCOPY  03/13/2017    5mm polyp, 5 yrs, Dr. Lady Walsh HX HYSTERECTOMY      supracervical    HX OOPHORECTOMY      bilateral    HX TONSILLECTOMY       Current Outpatient Medications   Medication Sig Dispense Refill    varicella-zoster recombinant, PF, (Shingrix, PF,) 50 mcg/0.5 mL susr injection 0.5mL by IntraMUSCular route once now and then repeat in 2-6 months 0.5 mL 1    montelukast (SINGULAIR) 10 mg tablet Take 1 Tablet by mouth daily. 90 Tablet 3    fluticasone propionate (FLONASE) 50 mcg/actuation nasal spray 2 Sprays by Both Nostrils route daily as needed for Allergies. Indications: inflammation of the nose due to an allergy 1 Bottle 5    diclofenac (VOLTAREN) 1 % gel diclofenac 1 % topical gel   APPLY 1 GRAM TO AFFECTED AREA THREE TIMES A DAY      estradioL (Aury) 0.1 mg/24 hr Apply 1 patch 2 times a week 1 Patch 0    estradioL (VAGIFEM) 10 mcg tab vaginal tablet Insert 1 tablet by vaginal route twice weekly 1 Tab 0    cetirizine (ZYRTEC) 10 mg tablet Take  by mouth daily. Allergies   Allergen Reactions    Acetaminophen-Codeine Nausea Only    Cephalosporins Other (comments)     Unable to take    Codeine Nausea and Vomiting    Codeine Sulfate Nausea Only    Penicillins Anaphylaxis and Itching       No family history on file.   Social History     Tobacco Use    Smoking status: Never Smoker    Smokeless tobacco: Never Used   Substance Use Topics    Alcohol use: No         Serina Barefoot, DNP

## 2021-08-19 PROBLEM — M26.629 TMJ SYNDROME: Status: RESOLVED | Noted: 2018-07-16 | Resolved: 2021-08-19

## 2021-08-19 PROBLEM — N95.1 MENOPAUSAL SYMPTOMS: Status: ACTIVE | Noted: 2021-08-19

## 2021-08-19 PROBLEM — J30.1 SEASONAL ALLERGIC RHINITIS DUE TO POLLEN: Status: RESOLVED | Noted: 2017-07-12 | Resolved: 2021-08-19

## 2021-08-19 PROBLEM — N20.0 RENAL CALCULUS: Status: RESOLVED | Noted: 2021-06-25 | Resolved: 2021-08-19

## 2021-08-19 PROBLEM — Z87.442 HISTORY OF KIDNEY STONES: Status: ACTIVE | Noted: 2021-08-19

## 2021-08-19 RX ORDER — ZOSTER VACCINE RECOMBINANT, ADJUVANTED 50 MCG/0.5
KIT INTRAMUSCULAR
Qty: 0.5 ML | Refills: 1
Start: 2021-08-17 | End: 2021-08-19

## 2021-08-19 NOTE — PROGRESS NOTES
[facility] [place] [address] [address] [facility], [address]  [phone] UOVPXD/[phone] fax    8/18/2021    HPI:   Nita Bower 1950 is a pleasant WHITE/NON- female who presents today to establish care and for routine physical exam.  She works as an audiologist.  She is an avid golfer. Kidney stones: Discomfort in her lower back started May 2021. She denies consuming a lot of sodas or calcium supplements. She saw Dr. Yvette Villagomez, urology, who told her she had a stone in her left kidney and to drink green tea. She did not have to undergo lithotripsy. KUB June 2021 revealed stone in the left kidney. It is thought that she passed the right ureteral stone. Seasonal allergies: Takes Singulair daily, Flonase and Zyrtec as needed. Tolerating these therapies well. Menopausal syndrome: Follows up with GYN Dr. Catha Soulier. She is taking estradiol 0.1 mg/24-hour patch and Vagifem 10 mg tablet vaginally as directed. Chronic back pain: She sees Dr. Jl Flores at [place] [address] and [facility]. She also is undergoing physical therapy weekly through [facility]. Dyslipidemia: She is aware of her elevated LDL but she also has an elevated HDL and her triglycerides are normal.  She eats healthy and exercises regularly. Past Medical History:   Diagnosis Date    Allergic rhinitis     Chronic midline low back pain without sciatica 7/12/2017    Chronic pain     lower back-Dr. Miguel Session    Hyperlipidemia LDL goal <100 06/20/2016    Primary osteoarthritis involving multiple joints 6/20/2016    Raynaud's disease 6/3/2013    Renal calculus 6/25/2021    CT  5/21  There are bilateral intrarenal calculi. There is right decreased nephrogram and dilation of the collecting system down to the deep pelvis. A pelvic calcification possibly representing a distal ureteral calculus is present on the right. Noncalculous obstruction is a greater concern.   Renal  sono 6/21 Right kidney measures 10.6 cm in length and is normal in density without hydronephrosis,    Sciatica 6/20/2016    Seasonal allergic rhinitis due to pollen 7/12/2017    TMJ syndrome 7/16/2018     Past Surgical History:   Procedure Laterality Date    COLONOSCOPY N/A 3/13/2017    COLONOSCOPY / polypect[facility] performed by Cristina Merritt MD at [facility] ENDOSCOPY    ENDOSCOPY, COLON, DIAGNOSTIC  2012     5 years, Dr. Elin Lovell    HX COLONOSCOPY  3/14/12    2 mm tubular adenoma--Terell    HX COLONOSCOPY  03/13/2017    5mm polyp, 5 yrs, Dr. Emilio Marcelo HX HYSTERECTOMY      supracervical    HX OOPHORECTOMY      bilateral    HX TONSILLECTOMY       Current Outpatient Medications   Medication Sig    montelukast (SINGULAIR) 10 mg tablet Take 1 Tablet by mouth daily.  fluticasone propionate (FLONASE) 50 mcg/actuation nasal spray 2 Sprays by Both Nostrils route daily as needed for Allergies. Indications: inflammation of the nose due to an allergy    diclofenac (VOLTAREN) 1 % gel diclofenac 1 % topical gel   APPLY 1 GRAM TO AFFECTED AREA THREE TIMES A DAY    estradioL (Aury) 0.1 mg/24 hr Apply 1 patch 2 times a week    estradioL (VAGIFEM) 10 mcg tab vaginal tablet Insert 1 tablet by vaginal route twice weekly    cetirizine (ZYRTEC) 10 mg tablet Take  by mouth daily. No current facility-administered medications for this visit. Allergies and Intolerances: Allergies   Allergen Reactions    Acetaminophen-Codeine Nausea Only    Cephalosporins Other (comments)     Unable to take    Codeine Nausea and Vomiting    Codeine Sulfate Nausea Only    Penicillins Anaphylaxis and Itching     Family History:   No family history on file. Social History:   She  reports that she has never smoked.  She has never used smokeless tobacco.   Social History     Substance and Sexual Activity   Alcohol Use No     Immunization History:  Immunization History   Administered Date(s) Administered   [facility] Covid-19, MODERNA, Mrna, Lnp-s, Pf, 100mcg/0.5mL 01/26/2021, 02/23/2021    Pneumococcal Conjugate (PCV-13) 07/12/2017    Pneumococcal Polysaccharide (PPSV-23) 07/16/2018       Review of Systems:   As above included in HPI. Otherwise 11 point review of systems negative including constitutional, skin, HENT, eyes, respiratory, cardiovascular, gastrointestinal, genitourinary, musculoskeletal, endocrine, hematologic, allergy, and neurologic. Physical:   Visit Vitals  /68   Pulse 73   Temp 98 °F (36.7 °C) (Temporal)   Resp 18   Ht 5' 7.5\" (1.715 m)   Wt 123 lb 12.8 oz (56.2 kg)   SpO2 99%   BMI 19.10 kg/m²      Wt Readings from Last 3 Encounters:   08/18/21 123 lb 12.8 oz (56.2 kg)   07/01/21 124 lb (56.2 kg)   06/03/21 121 lb (54.9 kg)         Exam:   Physical Exam  Vitals and nursing note reviewed. Constitutional:       Appearance: Normal appearance. HENT:      Head: Normocephalic and atraumatic. Right Ear: Tympanic membrane, ear canal and external ear normal.      Left Ear: Tympanic membrane, ear canal and external ear normal.   Eyes:      Extraocular Movements: Extraocular movements intact. Conjunctiva/sclera: Conjunctivae normal.   Neck:      Vascular: No carotid bruit. Cardiovascular:      Rate and Rhythm: Normal rate and regular rhythm. Pulses: Normal pulses. Heart sounds: Normal heart sounds. Comments: No edema noted  Pulmonary:      Effort: Pulmonary effort is normal. No respiratory distress. Breath sounds: Normal breath sounds. No wheezing. Abdominal:      General: Abdomen is flat. Bowel sounds are normal. There is no distension. Palpations: Abdomen is soft. Tenderness: There is no abdominal tenderness. Musculoskeletal:         General: Normal range of motion. Cervical back: Normal range of motion and neck supple. Comments: Gait stable   Skin:     General: Skin is warm and dry. Neurological:      General: No focal deficit present. Mental Status: She is alert and oriented to person, place, and time. Psychiatric:         Mood and Affect: Mood normal.         Behavior: Behavior normal.         Thought Content: Thought content normal.         Judgment: Judgment normal.       Review of Data:  Labs reviewed: Yes  TG 83;   CMP ok      Plan:    ICD-10-CM ICD-9-CM    1. Medicare annual wellness visit, subsequent  Z00.00 V70.0 DISCONTINUED: varicella-zoster recombinant, PF, (Shingrix, PF,) 50 mcg/0.5 mL susr injection   2. Advanced directives, counseling/discussion  Z71.89 V65.49 ADVANCE CARE PLANNING FIRST 30 MINS      FULL CODE   3. Encounter to establish care with new doctor  Z76.89 V65.8    4. Hyperlipidemia LDL goal <100  E78.5 272.4 LIPID PANEL   5. Seasonal allergic rhinitis due to pollen  J30.1 477.0 montelukast (SINGULAIR) 10 mg tablet      fluticasone propionate (FLONASE) 50 mcg/actuation nasal spray   6. Primary osteoarthritis involving multiple joints  M89.49 715.98    7. Chronic midline low back pain without sciatica  M54.5 724.2     G89.29 338.29    8. History of kidney stones  Z87.442 V13.01    9. Menopausal symptoms  N95.1 627.2      -MCR/ACP  MCR Wellness: Reviewed all HM and ordered tests/imaging appropriately. Reviewed care teams and medications with updates. Advanced Care Planning: Patient educated on the importance of an advanced care plan and paperwork was given to the patient today. Asked patient to read over the information and bring back at next appointment.    -Encounter to establish care new provider  Patient is transferring to me from their previous provider. I spent no less than 35 minutes reviewing and updating the chart to include previous labs, diagnostics, and specialty notes. Reviewed medication and completed the medication reconciliation with the patient. Reviewed side effects of medications with the patient. Questions were answered and patient verb understanding.   Informed patient chronic medication refills will not be given between their scheduled appointments; all refills will be given at the time of their scheduled follow-up appointments. Patient verbalized understanding    -Hyperlipidemia  TG 83;   With patient's elevated HDL this counteracts her LDL. No medication treatment needed at this time. Work on reducing fried fatty or oily foods in diet  Limit red meats and alcohol. Increase fiber. Limit fast food  Work on weight reduction  Increase water intake    -Environmental/seasonal allergies  continue Singulair daily  Continue Flonase and Zyrtec as needed  Avoid allergens (strong smells, animals, cigarettes or carpeted areas)    -Primary osteoarthritis (knees)  continue Voltaren gel as needed  Stay active  Instructed pt on therapeutic interventions to try at home. Apply ice/heat 3 times a day to help relieve pain; elevate if swelling noted. If taking a prescribed NSAID do not take OTC NSAIDs such as Motrin, Ibuprofen, Aleve or BC powder as this may lead to gastric complications. Patient verbalized understanding.    -Chronic midline low back pain  Specialist managed condition is being evaluated/managed by Dr. Karen Gonzalez. No acute findings today meriting change in management plan. Continue physical therapy as directed    -History of kidney stones  Specialist managed condition is being evaluated/managed by Dr. Antoni Carrasquillo. No acute findings today meriting change in management plan. -Menopausal symptoms  Specialist managed condition is being evaluated/managed by Dr. Juan Cardona. No acute findings today meriting change in management plan. Continue hormone therapy as prescribed      Follow up 1 year  Labs needed 1 week prior to appt: Yes  Lipid    Dr. Rian Ho, JOSEP-C, DNP  [facility]     Time does not include AWV or ACP time.

## 2021-08-19 NOTE — PROGRESS NOTES
[facility] [address] [address]   [place], [address]  [phone] ZKPCNN/[phone] fax    8/18/2021    HPI:   Shashank De Leon 1950 is a pleasant WHITE/NON- female who presents today to establish care and for routine physical exam.    Past Medical History:   Diagnosis Date    Allergic rhinitis     Chronic pain     lower back    Hyperlipidemia     Raynaud phenomenon      Past Surgical History:   Procedure Laterality Date    COLONOSCOPY N/A 3/13/2017    COLONOSCOPY / polypectomy performed by Timothy Patiño MD at [facility] ENDOSCOPY    ENDOSCOPY, COLON, DIAGNOSTIC  2012     5 years, Dr. Mark Ravi HX COLONOSCOPY  3/14/12    [facility]    HX COLONOSCOPY  03/13/2017    5mm polyp, 5 yrs, Dr. Frankey Rinks HX HYSTERECTOMY      supracervical    HX OOPHORECTOMY      bilateral    HX TONSILLECTOMY       Current Outpatient Medications   Medication Sig    montelukast (SINGULAIR) 10 mg tablet Take 1 Tablet by mouth daily.  diclofenac (VOLTAREN) 1 % gel diclofenac 1 % topical gel   APPLY 1 GRAM TO AFFECTED AREA THREE TIMES A DAY    ondansetron (Zofran ODT) 4 mg disintegrating tablet 1 Tablet by SubLINGual route every eight (8) hours as needed for Nausea or Vomiting.  estradioL (Aury) 0.1 mg/24 hr Apply 1 patch 2 times a week    estradioL (VAGIFEM) 10 mcg tab vaginal tablet Insert 1 tablet by vaginal route twice weekly    fluticasone propionate (FLONASE) 50 mcg/actuation nasal spray instill 2 sprays into each nostril once daily    cetirizine (ZYRTEC) 10 mg tablet Take  by mouth daily. No current facility-administered medications for this visit. Allergies and Intolerances:    Allergies   Allergen Reactions    Acetaminophen-Codeine Nausea Only    Cephalosporins Other (comments)     Unable to take    Codeine Nausea and Vomiting    Codeine Sulfate Nausea Only    Penicillins Anaphylaxis and Itching Family History:   No family history on file. Social History:   She  reports that she has never smoked. She has never used smokeless tobacco.   Social History     Substance and Sexual Activity   Alcohol Use No     Immunization History:  Immunization History   Administered Date(s) Administered    Covid-19, MODERNA, Mrna, Lnp-s, Pf, 100mcg/0.5mL 01/26/2021, 02/23/2021    Pneumococcal Conjugate (PCV-13) 07/12/2017    Pneumococcal Polysaccharide (PPSV-23) 07/16/2018       Review of Systems:   As above included in HPI. Otherwise 11 point review of systems negative including constitutional, skin, HENT, eyes, respiratory, cardiovascular, gastrointestinal, genitourinary, musculoskeletal, endocrine, hematologic, allergy, and neurologic. Physical:   Visit Vitals  /68   Pulse 73   Temp 98 °F (36.7 °C) (Temporal)   Resp 18   Ht 5' 7.5\" (1.715 m)   Wt 123 lb 12.8 oz (56.2 kg)   SpO2 99%   BMI 19.10 kg/m²      Wt Readings from Last 3 Encounters:   08/18/21 123 lb 12.8 oz (56.2 kg)   07/01/21 124 lb (56.2 kg)   06/03/21 121 lb (54.9 kg)         Exam:   Physical Exam      Review of Data:  Labs reviewed: N/A  Will obtain today    Plan:        Follow up {NUMBERS; 1-6:87476} [address] [place] [address] DAYS/WEEKS/MONTHS:316101}  Labs needed 1 week prior to appt: {YES (DEF)/NO:74924}    Dr. Graciela Martinez, AGNP-C, DNP  [facility]       ***Time does not include AWV or ACP time.

## 2021-08-19 NOTE — ACP (ADVANCE CARE PLANNING)
Advance Care Planning     Advance Care Planning (ACP) Physician/NP/PA Conversation      Date of Conversation: 8/18/2021  Conducted with: Patient with Decision Making Capacity    Healthcare Decision Maker:     Primary Decision Maker: Ramez Perez Yanet Quiroga - Spouse - [phone]  Click here to complete [place] including selection of the Healthcare Decision Maker Relationship (ie \"Primary\")        Today we documented Decision Maker(s) consistent with Legal Next of Kin hierarchy. Care Preferences:    Hospitalization: \"If your health worsens and it becomes clear that your chance of recovery is unlikely, what would be your preference regarding hospitalization? \"  The patient would prefer comfort-focused treatment without hospitalization. Ventilation: \"If you were unable to breathe on your own and your chance of recovery was unlikely, what would be your preference about the use of a ventilator (breathing machine) if it was available to you? \"   The patient would NOT desire the use of a ventilator. Resuscitation: \"In the event your heart stopped as a result of an underlying serious health condition, would you want attempts to be made to restart your heart, or would you prefer a natural death? \"   Yes, attempt to resuscitate.     Additional topics discussed: ventilation preferences, hospitalization preferences and resuscitation preferences    Conversation Outcomes / Follow-Up Plan:   ACP in process - completing/providing documents   Reviewed DNR/DNI and patient elects Full Code (Attempt Resuscitation)     Length of Voluntary ACP Conversation in minutes:  16 minutes    Bhumi Acuna DNP

## 2021-08-19 NOTE — PATIENT INSTRUCTIONS

## 2021-11-24 ENCOUNTER — HOSPITAL ENCOUNTER (OUTPATIENT)
Dept: WOMENS IMAGING | Age: 71
Discharge: HOME | End: 2021-11-24
Attending: OBSTETRICS & GYNECOLOGY
Payer: MEDICARE

## 2021-11-24 DIAGNOSIS — Z12.31 VISIT FOR SCREENING MAMMOGRAM: ICD-10-CM

## 2021-11-24 PROCEDURE — 77063 BREAST TOMOSYNTHESIS BI: CPT

## 2021-11-24 NOTE — PROGRESS NOTES
[facility] nurses: Please notify pt that her mammogram was normal. Pt will need yearly mammograms.  Thank you

## 2021-11-26 ENCOUNTER — TELEPHONE (OUTPATIENT)
Dept: INTERNAL MEDICINE CLINIC | Age: 71
End: 2021-11-26

## 2021-11-26 NOTE — TELEPHONE ENCOUNTER
----- Message from Jacinto Gr DNP sent at 11/24/2021  3:47 PM EST -----  [facility] nurses: Please notify pt that her mammogram was normal. Pt will need yearly mammograms.  Thank you

## 2021-11-26 NOTE — TELEPHONE ENCOUNTER
Patient reached and made aware that her mammogram was normal. Pt also informed she will need yearly mammograms. Pt verbalized understanding.

## 2022-03-18 PROBLEM — N95.1 MENOPAUSAL SYMPTOMS: Status: ACTIVE | Noted: 2021-08-19

## 2022-03-19 PROBLEM — G89.29 CHRONIC MIDLINE LOW BACK PAIN WITHOUT SCIATICA: Status: ACTIVE | Noted: 2017-07-12

## 2022-03-19 PROBLEM — Z87.442 HISTORY OF KIDNEY STONES: Status: ACTIVE | Noted: 2021-08-19

## 2022-03-19 PROBLEM — M54.50 CHRONIC MIDLINE LOW BACK PAIN WITHOUT SCIATICA: Status: ACTIVE | Noted: 2017-07-12

## 2022-03-24 PROBLEM — N88.8 CYST OF CERVIX: Status: ACTIVE | Noted: 2022-03-01

## 2022-04-13 DIAGNOSIS — J30.1 SEASONAL ALLERGIC RHINITIS DUE TO POLLEN: ICD-10-CM

## 2022-04-13 RX ORDER — FLUTICASONE PROPIONATE 50 MCG
SPRAY, SUSPENSION (ML) NASAL
Qty: 16 G | Refills: 3 | Status: SHIPPED | OUTPATIENT
Start: 2022-04-13 | End: 2022-08-19 | Stop reason: SDUPTHER

## 2022-04-15 DIAGNOSIS — J30.1 SEASONAL ALLERGIC RHINITIS DUE TO POLLEN: ICD-10-CM

## 2022-04-15 RX ORDER — FLUTICASONE PROPIONATE 50 MCG
SPRAY, SUSPENSION (ML) NASAL
Qty: 16 G | Refills: 3 | Status: CANCELLED | OUTPATIENT
Start: 2022-04-15

## 2022-07-13 PROBLEM — N20.0 RENAL CALCULUS: Status: RESOLVED | Noted: 2021-06-25 | Resolved: 2021-08-19

## 2022-08-12 ENCOUNTER — HOSPITAL ENCOUNTER (OUTPATIENT)
Dept: LAB | Age: 72
Discharge: HOME | End: 2022-08-12
Payer: MEDICARE

## 2022-08-12 ENCOUNTER — APPOINTMENT (OUTPATIENT)
Dept: INTERNAL MEDICINE CLINIC | Age: 72
End: 2022-08-12

## 2022-08-12 DIAGNOSIS — E78.5 HYPERLIPIDEMIA LDL GOAL <100: ICD-10-CM

## 2022-08-12 LAB
CHOLEST SERPL-MCNC: 270 MG/DL
HDLC SERPL-MCNC: 74 MG/DL (ref 40–60)
HDLC SERPL: 3.6 {RATIO} (ref 0–5)
LDLC SERPL CALC-MCNC: 163.8 MG/DL (ref 0–100)
LIPID PROFILE,FLP: ABNORMAL
TRIGL SERPL-MCNC: 161 MG/DL (ref ?–150)
VLDLC SERPL CALC-MCNC: 32.2 MG/DL

## 2022-08-12 PROCEDURE — 80061 LIPID PANEL: CPT

## 2022-08-12 PROCEDURE — 36415 COLL VENOUS BLD VENIPUNCTURE: CPT

## 2022-08-19 ENCOUNTER — OFFICE VISIT (OUTPATIENT)
Dept: INTERNAL MEDICINE CLINIC | Age: 72
End: 2022-08-19
Payer: MEDICARE

## 2022-08-19 ENCOUNTER — PATIENT OUTREACH (OUTPATIENT)
Dept: CASE MANAGEMENT | Age: 72
End: 2022-08-19

## 2022-08-19 VITALS
HEART RATE: 72 BPM | OXYGEN SATURATION: 99 % | HEIGHT: 64 IN | RESPIRATION RATE: 18 BRPM | TEMPERATURE: 97.5 F | DIASTOLIC BLOOD PRESSURE: 76 MMHG | WEIGHT: 123.2 LBS | SYSTOLIC BLOOD PRESSURE: 113 MMHG | BODY MASS INDEX: 21.03 KG/M2

## 2022-08-19 DIAGNOSIS — Z71.89 COUNSELING ON HEALTH PROMOTION AND DISEASE PREVENTION: ICD-10-CM

## 2022-08-19 DIAGNOSIS — M54.50 CHRONIC MIDLINE LOW BACK PAIN WITHOUT SCIATICA: ICD-10-CM

## 2022-08-19 DIAGNOSIS — J30.1 SEASONAL ALLERGIC RHINITIS DUE TO POLLEN: ICD-10-CM

## 2022-08-19 DIAGNOSIS — G89.29 CHRONIC MIDLINE LOW BACK PAIN WITHOUT SCIATICA: ICD-10-CM

## 2022-08-19 DIAGNOSIS — Z00.00 MEDICARE ANNUAL WELLNESS VISIT, SUBSEQUENT: Primary | ICD-10-CM

## 2022-08-19 DIAGNOSIS — N95.1 MENOPAUSAL SYMPTOMS: ICD-10-CM

## 2022-08-19 DIAGNOSIS — N39.3 STRESS INCONTINENCE: ICD-10-CM

## 2022-08-19 DIAGNOSIS — E78.2 MIXED HYPERLIPIDEMIA: ICD-10-CM

## 2022-08-19 DIAGNOSIS — Z71.89 ADVANCED DIRECTIVES, COUNSELING/DISCUSSION: ICD-10-CM

## 2022-08-19 DIAGNOSIS — M15.9 PRIMARY OSTEOARTHRITIS INVOLVING MULTIPLE JOINTS: ICD-10-CM

## 2022-08-19 DIAGNOSIS — Z12.31 BREAST CANCER SCREENING BY MAMMOGRAM: ICD-10-CM

## 2022-08-19 PROCEDURE — G8427 DOCREV CUR MEDS BY ELIG CLIN: HCPCS | Performed by: NURSE PRACTITIONER

## 2022-08-19 PROCEDURE — 3017F COLORECTAL CA SCREEN DOC REV: CPT | Performed by: NURSE PRACTITIONER

## 2022-08-19 PROCEDURE — 99497 ADVNCD CARE PLAN 30 MIN: CPT | Performed by: NURSE PRACTITIONER

## 2022-08-19 PROCEDURE — G9899 SCRN MAM PERF RSLTS DOC: HCPCS | Performed by: NURSE PRACTITIONER

## 2022-08-19 PROCEDURE — G8399 PT W/DXA RESULTS DOCUMENT: HCPCS | Performed by: NURSE PRACTITIONER

## 2022-08-19 PROCEDURE — 1090F PRES/ABSN URINE INCON ASSESS: CPT | Performed by: NURSE PRACTITIONER

## 2022-08-19 PROCEDURE — G0439 PPPS, SUBSEQ VISIT: HCPCS | Performed by: NURSE PRACTITIONER

## 2022-08-19 PROCEDURE — 1123F ACP DISCUSS/DSCN MKR DOCD: CPT | Performed by: NURSE PRACTITIONER

## 2022-08-19 PROCEDURE — G8536 NO DOC ELDER MAL SCRN: HCPCS | Performed by: NURSE PRACTITIONER

## 2022-08-19 PROCEDURE — 1101F PT FALLS ASSESS-DOCD LE1/YR: CPT | Performed by: NURSE PRACTITIONER

## 2022-08-19 PROCEDURE — G8432 DEP SCR NOT DOC, RNG: HCPCS | Performed by: NURSE PRACTITIONER

## 2022-08-19 PROCEDURE — 99214 OFFICE O/P EST MOD 30 MIN: CPT | Performed by: NURSE PRACTITIONER

## 2022-08-19 PROCEDURE — G8420 CALC BMI NORM PARAMETERS: HCPCS | Performed by: NURSE PRACTITIONER

## 2022-08-19 PROCEDURE — G0463 HOSPITAL OUTPT CLINIC VISIT: HCPCS | Performed by: NURSE PRACTITIONER

## 2022-08-19 RX ORDER — MONTELUKAST SODIUM 10 MG/1
10 TABLET ORAL DAILY
Qty: 93 TABLET | Refills: 3 | Status: SHIPPED | OUTPATIENT
Start: 2022-08-19

## 2022-08-19 RX ORDER — FLUTICASONE PROPIONATE 50 MCG
SPRAY, SUSPENSION (ML) NASAL
Qty: 3 EACH | Refills: 4 | Status: SHIPPED | OUTPATIENT
Start: 2022-08-19

## 2022-08-19 NOTE — PROGRESS NOTES
[facility] [id number] [place] [address]  [phone] [phone] fax    8/19/2022    HPI:   Juliann Rader 1950 is a pleasant [address] female. Retired audiologist. Avid golfer, walker. Past Medical History:   Diagnosis Date    Allergic rhinitis     Chronic midline low back pain without sciatica 7/12/2017    Chronic pain     lower back-Dr. Minor Sean    Cyst of cervix 03/2022    Dr Hollins Miss    Hyperlipidemia LDL goal <100 06/20/2016    Primary osteoarthritis involving multiple joints 6/20/2016    Raynaud's disease 6/3/2013    Renal calculus 6/25/2021    CT  5/21  There are bilateral intrarenal calculi. There is right decreased nephrogram and dilation of the collecting system down to the deep pelvis. A pelvic calcification possibly representing a distal ureteral calculus is present on the right. Noncalculous obstruction is a greater concern. Renal  sono 6/21  Right kidney measures 10.6 cm in length and is normal in density without hydronephrosis,    Sciatica 6/20/2016    Seasonal allergic rhinitis due to pollen 7/12/2017    TMJ syndrome 7/16/2018     Past Surgical History:   Procedure Laterality Date    COLONOSCOPY N/A 3/13/2017    COLONOSCOPY / polypectomy performed by Maia Hilario MD at [facility], COLON, DIAGNOSTIC  2012     5 years, Dr. Claudette Alcon    HX COLONOSCOPY  3/14/12    2 mm tubular adenoma--Terell    HX COLONOSCOPY  03/13/2017    5mm polyp, 5 yrs, Dr. Lexx Morin      HX HYSTERECTOMY      supracervical    HX OOPHORECTOMY      bilateral    HX TONSILLECTOMY       Current Outpatient Medications   Medication Sig    fluticasone propionate (FLONASE) 50 mcg/actuation nasal spray instill 2 sprays into each nostril daily if needed for allergies    montelukast (SINGULAIR) 10 mg tablet Take 1 Tablet by mouth daily.     diclofenac (VOLTAREN) 1 % gel diclofenac 1 % topical gel   APPLY 1 GRAM TO AFFECTED AREA THREE TIMES A DAY    estradioL (Aury) 0.1 mg/24 hr Apply 1 patch 2 times a week    estradioL (VAGIFEM) 10 mcg tab vaginal tablet Insert 1 tablet by vaginal route twice weekly    cetirizine (ZYRTEC) 10 mg tablet Take  by mouth daily. No current facility-administered medications for this visit. Allergies and Intolerances: Allergies   Allergen Reactions    Acetaminophen-Codeine Nausea Only    Cephalosporins Other (comments)     Unable to take    Codeine Nausea and Vomiting    Codeine Sulfate Nausea Only    Penicillins Anaphylaxis and Itching     Family History:   No family history on file. Social History:   She  reports that she has never smoked. She has never used smokeless tobacco.   Social History     Substance and Sexual Activity   Alcohol Use No     Immunization History:  Immunization History   Administered Date(s) Administered    COVID-19, MODERNA BLUE border, Primary or Immunocompromised, (age 18y+), IM, 100 mcg/0.5mL 01/26/2021, 02/23/2021    COVID-19, MODERNA Booster BLUE border, (age 18y+), IM, 50mcg/0.25mL 11/28/2021    Pneumococcal Conjugate (PCV-13) 07/12/2017    Pneumococcal Polysaccharide (PPSV-23) 07/16/2018     Todays concerns/HPI:  Stress incontinence. Leaking when walking, sneezing, laughing. Seeking referral to uro/GYN. . Scheduled for colonoscopy 9/6/22 under Dr Donald Nolasco. Seasonal allergies: Takes Singulair daily, Flonase and Zyrtec as needed. Tolerating these therapies well. Dyslipidemia: She is aware of her elevated LDL along with an elevated HDL and slightly elevated triglycerides. She eats healthy and exercises regularly. Not seeking medication. Menopausal syndrome: Follows up with GYN Dr. Hank Booker. She is taking estradiol 0.1 mg/24-hour patch and Vagifem 10 mg tablet vaginally as directed. Chronic back pain: She sees Dr. Eyal Rangel at [place] [address] and [address] [place] [address]. Review of Systems:   As above included in HPI.   Otherwise 11 point review of systems negative including constitutional, skin, HENT, eyes, respiratory, cardiovascular, gastrointestinal, genitourinary, musculoskeletal, endocrine, hematologic, allergy, and neurologic. Physical:   Visit Vitals  /76   Pulse 72   Temp 97.5 °F (36.4 °C) (Temporal)   Resp 18   Ht 5' 4\" (1.626 m)   Wt 123 lb 3.2 oz (55.9 kg)   SpO2 99%   BMI 21.15 kg/m²      Wt Readings from Last 3 Encounters:   08/19/22 123 lb 3.2 oz (55.9 kg)   07/15/22 122 lb (55.3 kg)   08/18/21 123 lb 12.8 oz (56.2 kg)       Exam:   Physical Exam  Vitals and nursing note reviewed. Constitutional:       Appearance: Normal appearance. HENT:      Head: Normocephalic and atraumatic. Right Ear: Tympanic membrane, ear canal and external ear normal.      Left Ear: Tympanic membrane, ear canal and external ear normal.   Eyes:      Extraocular Movements: Extraocular movements intact. Conjunctiva/sclera: Conjunctivae normal.   Neck:      Vascular: No carotid bruit. Cardiovascular:      Rate and Rhythm: Normal rate and regular rhythm. Pulses: Normal pulses. Heart sounds: Normal heart sounds. Comments: No edema noted to barry LEs  Pulmonary:      Effort: Pulmonary effort is normal. No respiratory distress. Breath sounds: Normal breath sounds. No wheezing. Abdominal:      General: Abdomen is flat. Bowel sounds are normal. There is no distension. Palpations: Abdomen is soft. Tenderness: There is no abdominal tenderness. Musculoskeletal:         General: Normal range of motion. Cervical back: Normal range of motion and neck supple. Comments: Gait stable  Strength to all extremities 5/5 for age   Skin:     General: Skin is warm and dry. Comments: Small skin tear Left shin   Neurological:      General: No focal deficit present. Mental Status: She is alert and oriented to person, place, and time.    Psychiatric:         Mood and Affect: Mood normal.         Behavior: Behavior normal.         Thought Content: Thought content normal.     Reviewed Labs:  TG ; HDL 74; -163      Plan:  1. Medicare annual wellness visit, subsequent  [facility] Wellness: Reviewed all HM and ordered tests/imaging appropriately. Reviewed care teams and medications with updates. Advanced Care Planning: Patient educated on the importance of an advanced care plan and paperwork was given to the patient today. Asked patient to read over the information and bring back at next appointment. 2. Advanced directives, counseling/discussion    - ADVANCE CARE PLANNING FIRST 30 MINS  - DO NOT RESUSCITATE  - REFERRAL TO ACP CLINICAL SPECIALIST    3. Seasonal allergic rhinitis due to pollen  Avoid allergens (strong smells, animals, cigarettes or carpeted areas)    - montelukast (SINGULAIR) 10 mg tablet; Take 1 Tablet by mouth daily. Dispense: 93 Tablet; Refill: 3  - fluticasone propionate (FLONASE) 50 mcg/actuation nasal spray; instill 2 sprays into each nostril daily if needed for allergies  Dispense: 3 Each; Refill: 4    4. Stress incontinence    - REFERRAL TO GYNECOLOGY/UROLOGY    5. Mixed hyperlipidemia  TG ; HDL 74; -163  Continue to stay active  Limit fried fatty oily foods  No tx at this time    6. Menopausal symptoms  Specialist managed condition is being evaluated/managed by Dr. Shonna Mancilla. No acute findings today meriting change in management plan. Continue hormone therapy as prescribed    7. Primary osteoarthritis involving multiple joints  continue Voltaren gel as needed  Stay active    8. Chronic midline low back pain without sciatica  Specialist managed condition is being evaluated/managed by Dr. Apolinar Brock. No acute findings today meriting change in management plan. 9. Breast cancer screening by mammogram  Due 11/2022    - [facility] 3D EDELMIRA W MAMMO BI SCREENING INCL CAD; Future    10.  Counseling on health promotion and disease prevention  Colonoscopy scheduled for 9/6/22    Follow up 1 year AWV (Lipid, CMP)      Dr. Adriano Ma, AGNP-C, DNP  [facility]

## 2022-08-19 NOTE — ACP (ADVANCE CARE PLANNING)
Advance Care Planning     Advance Care Planning (ACP) Physician/NP/PA Conversation      Date of Conversation: 8/19/2022  Conducted with: Patient with Decision Making Capacity    Healthcare Decision Maker:     Primary Decision Maker: Orestes Payne. Urbano Dolan - Spouse - [phone]  Click here to complete [address] including selection of the Healthcare Decision Maker Relationship (ie \"Primary\")      Today we documented Decision Maker(s). The patient will provide ACP documents. Care Preferences:    Hospitalization: \"If your health worsens and it becomes clear that your chance of recovery is unlikely, what would be your preference regarding hospitalization? \"  The patient would prefer comfort-focused treatment without hospitalization. Ventilation: \"If you were unable to breathe on your own and your chance of recovery was unlikely, what would be your preference about the use of a ventilator (breathing machine) if it was available to you? \"   The patient would NOT desire the use of a ventilator. Resuscitation: \"In the event your heart stopped as a result of an underlying serious health condition, would you want attempts to be made to restart your heart, or would you prefer a natural death? \"   No, do NOT attempt to resuscitate.     Additional topics discussed: ventilation preferences, hospitalization preferences, and resuscitation preferences    Conversation Outcomes / Follow-Up Plan:   ACP incomplete - refer to ACP Clinical Specialist  Reviewed DNR/DNI and patient elects DNR order - referred to ACP Clinical Specialist & placed order     Length of Voluntary ACP Conversation in minutes:  16 minutes    Epifanio Horner DNP

## 2022-08-19 NOTE — PATIENT INSTRUCTIONS

## 2022-08-19 NOTE — PROGRESS NOTES
This is the Subsequent Medicare Annual Wellness Exam, performed 12 months or more after the Initial AWV or the last Subsequent AWV    I have reviewed the patient's medical history in detail and updated the computerized patient record. Assessment/Plan   Education and counseling provided:  Are appropriate based on today's review and evaluation  Screening Mammography  Colorectal cancer screening tests  Bone mass measurement (DEXA)  Screening for glaucoma  Overall pt is doing well. 1. Medicare annual wellness visit, subsequent  2. Advanced directives, counseling/discussion  -     ADVANCE CARE PLANNING FIRST 30 MINS  -     DO NOT RESUSCITATE  -     REFERRAL TO ACP CLINICAL SPECIALIST  3. Seasonal allergic rhinitis due to pollen  -     montelukast (SINGULAIR) 10 mg tablet; Take 1 Tablet by mouth daily. , Normal, Disp-93 Tablet, R-3  -     fluticasone propionate (FLONASE) 50 mcg/actuation nasal spray; instill 2 sprays into each nostril daily if needed for allergies, Normal, Disp-3 Each, R-4  4. Stress incontinence  -     REFERRAL TO GYNECOLOGY  5. Mixed hyperlipidemia  6. Menopausal symptoms  7. Primary osteoarthritis involving multiple joints  8. Chronic midline low back pain without sciatica  9. Breast cancer screening by mammogram  -     [facility] 3D EDELMIRA W MAMMO BI SCREENING INCL CAD; Future  10.  Counseling on health promotion and disease prevention     Depression Risk Factor Screening     3 most recent PHQ Screens 8/19/2022   Little interest or pleasure in doing things Not at all   Feeling down, depressed, irritable, or hopeless Not at all   Total Score PHQ 2 0   Trouble falling or staying asleep, or sleeping too much Not at all   Feeling tired or having little energy Not at all   Poor appetite, weight loss, or overeating Not at all   Feeling bad about yourself - or that you are a failure or have let yourself or your family down Not at all   Trouble concentrating on things such as school, work, reading, or watching TV Not at all   Moving or speaking so slowly that other people could have noticed; or the opposite being so fidgety that others notice Not at all   Thoughts of being better off dead, or hurting yourself in some way Not at all   PHQ 9 Score 0   How difficult have these problems made it for you to do your work, take care of your home and get along with others Not difficult at all       Alcohol & Drug Abuse Risk Screen    Do you average more than 1 drink per night or more than 7 drinks a week:  No    On any one occasion in the past three months have you have had more than 3 drinks containing alcohol:  No          Functional Ability and Level of Safety    Hearing: Hearing is good. Activities of Daily Living: The home contains: no safety equipment. Patient does total self care      Ambulation: with no difficulty     Fall Risk:  Fall Risk Assessment, last 12 mths 8/19/2022   Able to walk? Yes   Fall in past 12 months? 0   Do you feel unsteady?  0   Are you worried about falling 0      Abuse Screen:  Patient is not abused       Cognitive Screening    Has your family/caregiver stated any concerns about your memory: no     Cognitive Screening: Normal - Clock Drawing Test    Health Maintenance Due     Health Maintenance Due   Topic Date Due    Colorectal Cancer Screening Combo  03/13/2022       Patient Care Team   Patient Care Team:  Rabia Hart DNP as PCP - General (Nurse Practitioner)  Rabia Hart DNP as PCP - Katrina Martinled Provider  Emily Presley MD (Obstetrics & Gynecology)  Cheryle Farah DO (Physical Medicine and Rehabilitation Physician)  Julia Dang MD as Consulting Provider (Gastroenterology)    History     Patient Active Problem List   Diagnosis Code    Primary osteoarthritis involving multiple joints M15.9    Chronic midline low back pain without sciatica M54.50, G89.29    Mixed hyperlipidemia E78.2    History of kidney stones Z87.442    Menopausal symptoms N95.1    Cyst of cervix N88.8    Stress incontinence N39.3     Past Medical History:   Diagnosis Date    Allergic rhinitis     Chronic midline low back pain without sciatica 07/12/2017    Dr Thomas Almeida    Cyst of cervix 03/2022    Dr Adonis Armando    Hyperlipidemia LDL goal <100 06/20/2016    Mixed hyperlipidemia 06/20/2016    Primary osteoarthritis involving multiple joints 06/20/2016    Raynaud's disease 06/03/2013    Renal calculus 06/25/2021    CT  5/21  There are bilateral intrarenal calculi. There is right decreased nephrogram and dilation of the collecting system down to the deep pelvis. A pelvic calcification possibly representing a distal ureteral calculus is present on the right. Noncalculous obstruction is a greater concern. Renal  sono 6/21  Right kidney measures 10.6 cm in length and is normal in density without hydronephrosis,    Sciatica 06/20/2016    Seasonal allergic rhinitis due to pollen 07/12/2017    Stress incontinence 08/19/2022    TMJ syndrome 07/16/2018      Past Surgical History:   Procedure Laterality Date    COLONOSCOPY N/A 3/13/2017    COLONOSCOPY / polypectomy performed by Stas Miner MD at [facility], COLON, DIAGNOSTIC  2012     5 years, Dr. Leighann Wood    HX COLONOSCOPY  3/14/12    [facility]    HX COLONOSCOPY  03/13/2017    5mm polyp, 5 yrs, Dr. Rosina Urban      HX HYSTERECTOMY      supracervical    HX OOPHORECTOMY      bilateral    HX TONSILLECTOMY       Current Outpatient Medications   Medication Sig Dispense Refill    montelukast (SINGULAIR) 10 mg tablet Take 1 Tablet by mouth daily.  93 Tablet 3    fluticasone propionate (FLONASE) 50 mcg/actuation nasal spray instill 2 sprays into each nostril daily if needed for allergies 3 Each 4    diclofenac (VOLTAREN) 1 % gel diclofenac 1 % topical gel   APPLY 1 GRAM TO AFFECTED AREA THREE TIMES A DAY      estradioL (Aury) 0.1 mg/24 hr Apply 1 patch 2 times a week 1 Patch 0    estradioL (VAGIFEM) 10 mcg tab vaginal tablet Insert 1 tablet by vaginal route twice weekly 1 Tab 0    cetirizine (ZYRTEC) 10 mg tablet Take  by mouth daily. Allergies   Allergen Reactions    Acetaminophen-Codeine Nausea Only    Cephalosporins Other (comments)     Unable to take    Codeine Nausea and Vomiting    Codeine Sulfate Nausea Only    Penicillins Anaphylaxis and Itching       No family history on file.   Social History     Tobacco Use    Smoking status: Never    Smokeless tobacco: Never   Substance Use Topics    Alcohol use: No         Oscar Maureen, DNP

## 2022-08-19 NOTE — PROGRESS NOTES
Chief Complaint   Patient presents with    Annual Wellness Visit    Labs     Completed 8/12/2022     1. \"Have you been to the ER, urgent care clinic since your last visit? Hospitalized since your last visit? \" No    2. \"Have you seen or consulted any other health care providers outside of the [address] [place] [address] since your last visit? \"  Yes urology      3. For patients aged 39-70: Has the patient had a colonoscopy / FIT/ Cologuard? Yes - Care Gap present. Most recent result on file      If the patient is female:    4. For patients aged 41-77: Has the patient had a mammogram within the past 2 years? Yes - Care Gap present. Most recent result on file      5. For patients aged 21-65: Has the patient had a pap smear?  NA - based on age or sex

## 2022-09-06 NOTE — ACP (ADVANCE CARE PLANNING)
Advance Care Planning   Ambulatory ACP Specialist Patient Outreach    Date:  8/19/2022, 09/13/22 09/19/22        ACP Specialist:  Wilbert Erickson LPN    Outreach call to patient in follow-up to ACP Specialist referral from:    [x] PCP  [] Provider   [] Ambulatory Care Management [] Other     For:                  [x] Advance Directive Assistance              [] Complete Portable DNR order              [] Complete POST/MOST              [] Code Status Discussion             [] Discuss Goals of Care             [] Early ACP Decision-Making              [] Other (Specify)    Date Referral Received: 8/19/22    Today's Outreach:  [x] First   [x] Second  [x] Third       Third outreach made by: [] Phone  [] Email / mail    [] [employer]     Intervention:  [] Spoke with Patient   [x] Left VM requesting return call      Outcome:     Late Entry  The first attempt was made to contact pt regarding the ACP referral. No answer on mobile phone. VM left requesting a return call. Will attempt second outreach within one week. Next Step:   [] ACP scheduled conversation  [x] Outreach again in one week               [x] Email / Mail ACP Info Sheets  [] Email / Mail Advance Directive   [x] Closing referral.  Routing closure to referring provider/staff and to ACP Specialist . [x] Closure letter mailed to patient with invitation to contact ACP Specialist if / when ready. Thank you for this referral.     09/13/22  The second attempt was made to contact pt regarding ACP referral. No answer on mobile phone. VM left requesting a return call. Will outreach again within one week.    09/19/22  The final attempt was made to reach the pt. ACP documents sent to pt via e-mail. LPN's contact information was included. We will close the referral at this time.

## 2022-09-13 ENCOUNTER — PATIENT OUTREACH (OUTPATIENT)
Dept: CASE MANAGEMENT | Age: 72
End: 2022-09-13

## 2022-09-19 ENCOUNTER — PATIENT OUTREACH (OUTPATIENT)
Dept: CASE MANAGEMENT | Age: 72
End: 2022-09-19

## 2022-09-23 ENCOUNTER — TELEPHONE (OUTPATIENT)
Dept: INTERNAL MEDICINE CLINIC | Age: 72
End: 2022-09-23

## 2022-09-23 DIAGNOSIS — T75.3XXA MOTION SICKNESS, INITIAL ENCOUNTER: Primary | ICD-10-CM

## 2022-09-23 RX ORDER — SCOLOPAMINE TRANSDERMAL SYSTEM 1 MG/1
1 PATCH, EXTENDED RELEASE TRANSDERMAL
Qty: 3 PATCH | Refills: 0 | Status: SHIPPED | OUTPATIENT
Start: 2022-09-23

## 2022-09-23 NOTE — TELEPHONE ENCOUNTER
Patient returned call and was given message. She verbalized understanding and has no further questions at this time.  Thank you

## 2022-09-23 NOTE — TELEPHONE ENCOUNTER
Sent a prescription for scopolamine patches. Please inform her she is to use 1 patch every 3 days behind her ears. When she removes 1 patch she is to wash the area well and then place a new patch on the other side. 1 major side effect of this medication is blurred vision. This may become noticeable after 3 to 5 days. I hope she has a great trip!

## 2022-09-23 NOTE — TELEPHONE ENCOUNTER
Patient stating she is going on a cruise and she wants to know if a patch can be prescribed for motion sickness.

## 2022-11-01 DIAGNOSIS — Z12.31 BREAST CANCER SCREENING BY MAMMOGRAM: ICD-10-CM

## 2022-11-28 ENCOUNTER — HOSPITAL ENCOUNTER (OUTPATIENT)
Dept: WOMENS IMAGING | Age: 72
Discharge: HOME | End: 2022-11-28
Attending: NURSE PRACTITIONER
Payer: MEDICARE

## 2022-11-28 PROCEDURE — 77063 BREAST TOMOSYNTHESIS BI: CPT

## 2023-02-21 ENCOUNTER — TELEPHONE (OUTPATIENT)
Age: 73
End: 2023-02-21

## 2023-02-21 DIAGNOSIS — N39.3 STRESS INCONTINENCE: Primary | ICD-10-CM

## 2023-02-21 NOTE — TELEPHONE ENCOUNTER
Pt was referred to Yon Maldonado MD back in August , she had to wait until March to be seen , she just was called and told Dr is closing the practice . She is asking for a referral to another Gyn ,she said it doesn't have to be [place]

## 2023-02-22 NOTE — TELEPHONE ENCOUNTER
Pt reached and made aware referral was faxed over to [facility] with Dr. Olu Plummer. She may call to set up appt if she doesn't hear anything from office soon.

## 2023-06-09 ENCOUNTER — CLINICAL DOCUMENTATION (OUTPATIENT)
Age: 73
End: 2023-06-09

## 2023-06-09 NOTE — PROGRESS NOTES
Please inform patient there is a recall on the Flonase. Have her reach out to her pharmacy to inquire if her medication is included in that recall.   Thank you

## 2023-08-09 DIAGNOSIS — Z13.228 SCREENING FOR METABOLIC DISORDER: ICD-10-CM

## 2023-08-09 DIAGNOSIS — E78.2 MIXED HYPERLIPIDEMIA: Primary | ICD-10-CM

## 2023-08-11 ENCOUNTER — HOSPITAL ENCOUNTER (OUTPATIENT)
Facility: HOSPITAL | Age: 73
Setting detail: SPECIMEN
End: 2023-08-11
Payer: MEDICARE

## 2023-08-11 DIAGNOSIS — Z13.228 SCREENING FOR METABOLIC DISORDER: ICD-10-CM

## 2023-08-11 DIAGNOSIS — E78.2 MIXED HYPERLIPIDEMIA: ICD-10-CM

## 2023-08-11 LAB
ALBUMIN SERPL-MCNC: 3.7 G/DL (ref 3.4–5)
ALBUMIN/GLOB SERPL: 1.5 (ref 0.8–1.7)
ALP SERPL-CCNC: 59 U/L (ref 45–117)
ALT SERPL-CCNC: 22 U/L (ref 13–56)
ANION GAP SERPL CALC-SCNC: 5 MMOL/L (ref 3–18)
AST SERPL-CCNC: 16 U/L (ref 10–38)
BILIRUB SERPL-MCNC: 0.5 MG/DL (ref 0.2–1)
BUN SERPL-MCNC: 20 MG/DL (ref 7–18)
BUN/CREAT SERPL: 38 (ref 12–20)
CALCIUM SERPL-MCNC: 9 MG/DL (ref 8.5–10.1)
CHLORIDE SERPL-SCNC: 106 MMOL/L (ref 100–111)
CHOLEST SERPL-MCNC: 247 MG/DL
CO2 SERPL-SCNC: 27 MMOL/L (ref 21–32)
CREAT SERPL-MCNC: 0.53 MG/DL (ref 0.6–1.3)
GLOBULIN SER CALC-MCNC: 2.5 G/DL (ref 2–4)
GLUCOSE SERPL-MCNC: 85 MG/DL (ref 74–99)
HDLC SERPL-MCNC: 84 MG/DL (ref 40–60)
HDLC SERPL: 2.9 (ref 0–5)
LDLC SERPL CALC-MCNC: 144 MG/DL (ref 0–100)
LIPID PANEL: ABNORMAL
POTASSIUM SERPL-SCNC: 4.3 MMOL/L (ref 3.5–5.5)
PROT SERPL-MCNC: 6.2 G/DL (ref 6.4–8.2)
SODIUM SERPL-SCNC: 138 MMOL/L (ref 136–145)
TRIGL SERPL-MCNC: 95 MG/DL
VLDLC SERPL CALC-MCNC: 19 MG/DL

## 2023-08-11 PROCEDURE — 36415 COLL VENOUS BLD VENIPUNCTURE: CPT

## 2023-08-11 PROCEDURE — 80061 LIPID PANEL: CPT

## 2023-08-11 PROCEDURE — 80053 COMPREHEN METABOLIC PANEL: CPT

## 2023-08-17 SDOH — ECONOMIC STABILITY: FOOD INSECURITY: WITHIN THE PAST 12 MONTHS, THE FOOD YOU BOUGHT JUST DIDN'T LAST AND YOU DIDN'T HAVE MONEY TO GET MORE.: NEVER TRUE

## 2023-08-17 SDOH — ECONOMIC STABILITY: HOUSING INSECURITY
IN THE LAST 12 MONTHS, WAS THERE A TIME WHEN YOU DID NOT HAVE A STEADY PLACE TO SLEEP OR SLEPT IN A SHELTER (INCLUDING NOW)?: NO

## 2023-08-17 SDOH — ECONOMIC STABILITY: FOOD INSECURITY: WITHIN THE PAST 12 MONTHS, YOU WORRIED THAT YOUR FOOD WOULD RUN OUT BEFORE YOU GOT MONEY TO BUY MORE.: NEVER TRUE

## 2023-08-17 SDOH — HEALTH STABILITY: PHYSICAL HEALTH: ON AVERAGE, HOW MANY DAYS PER WEEK DO YOU ENGAGE IN MODERATE TO STRENUOUS EXERCISE (LIKE A BRISK WALK)?: 6 DAYS

## 2023-08-17 SDOH — ECONOMIC STABILITY: TRANSPORTATION INSECURITY
IN THE PAST 12 MONTHS, HAS LACK OF TRANSPORTATION KEPT YOU FROM MEETINGS, WORK, OR FROM GETTING THINGS NEEDED FOR DAILY LIVING?: NO

## 2023-08-17 SDOH — HEALTH STABILITY: PHYSICAL HEALTH: ON AVERAGE, HOW MANY MINUTES DO YOU ENGAGE IN EXERCISE AT THIS LEVEL?: 90 MIN

## 2023-08-17 SDOH — ECONOMIC STABILITY: INCOME INSECURITY: HOW HARD IS IT FOR YOU TO PAY FOR THE VERY BASICS LIKE FOOD, HOUSING, MEDICAL CARE, AND HEATING?: NOT HARD AT ALL

## 2023-08-17 ASSESSMENT — LIFESTYLE VARIABLES
HOW MANY STANDARD DRINKS CONTAINING ALCOHOL DO YOU HAVE ON A TYPICAL DAY: 1
HOW OFTEN DO YOU HAVE A DRINK CONTAINING ALCOHOL: 2-3 TIMES A WEEK
HOW MANY STANDARD DRINKS CONTAINING ALCOHOL DO YOU HAVE ON A TYPICAL DAY: 1 OR 2
HOW OFTEN DO YOU HAVE SIX OR MORE DRINKS ON ONE OCCASION: 1
HOW OFTEN DO YOU HAVE A DRINK CONTAINING ALCOHOL: 4

## 2023-08-17 ASSESSMENT — PATIENT HEALTH QUESTIONNAIRE - PHQ9
SUM OF ALL RESPONSES TO PHQ9 QUESTIONS 1 & 2: 0
1. LITTLE INTEREST OR PLEASURE IN DOING THINGS: 0
2. FEELING DOWN, DEPRESSED OR HOPELESS: 0
SUM OF ALL RESPONSES TO PHQ QUESTIONS 1-9: 0

## 2023-08-18 ENCOUNTER — OFFICE VISIT (OUTPATIENT)
Age: 73
End: 2023-08-18
Payer: MEDICARE

## 2023-08-18 VITALS
OXYGEN SATURATION: 98 % | BODY MASS INDEX: 22.5 KG/M2 | DIASTOLIC BLOOD PRESSURE: 60 MMHG | SYSTOLIC BLOOD PRESSURE: 126 MMHG | TEMPERATURE: 97.8 F | RESPIRATION RATE: 16 BRPM | WEIGHT: 127 LBS | HEART RATE: 69 BPM | HEIGHT: 63 IN

## 2023-08-18 DIAGNOSIS — Z78.0 ASYMPTOMATIC MENOPAUSAL STATE: ICD-10-CM

## 2023-08-18 DIAGNOSIS — M15.9 PRIMARY OSTEOARTHRITIS INVOLVING MULTIPLE JOINTS: ICD-10-CM

## 2023-08-18 DIAGNOSIS — Z00.00 MEDICARE ANNUAL WELLNESS VISIT, SUBSEQUENT: Primary | ICD-10-CM

## 2023-08-18 DIAGNOSIS — Z23 NEED FOR PROPHYLACTIC VACCINATION AND INOCULATION AGAINST VARICELLA: ICD-10-CM

## 2023-08-18 DIAGNOSIS — N95.2 VAGINAL ATROPHY: ICD-10-CM

## 2023-08-18 DIAGNOSIS — G89.29 CHRONIC MIDLINE LOW BACK PAIN WITHOUT SCIATICA: ICD-10-CM

## 2023-08-18 DIAGNOSIS — J30.89 ENVIRONMENTAL AND SEASONAL ALLERGIES: ICD-10-CM

## 2023-08-18 DIAGNOSIS — Z23 NEED FOR PROPHYLACTIC VACCINATION AGAINST DIPHTHERIA-TETANUS-PERTUSSIS (DTP): ICD-10-CM

## 2023-08-18 DIAGNOSIS — Z12.31 BREAST CANCER SCREENING BY MAMMOGRAM: ICD-10-CM

## 2023-08-18 DIAGNOSIS — N95.1 MENOPAUSAL SYMPTOMS: ICD-10-CM

## 2023-08-18 DIAGNOSIS — N39.3 STRESS INCONTINENCE: ICD-10-CM

## 2023-08-18 DIAGNOSIS — E78.2 MIXED HYPERLIPIDEMIA: ICD-10-CM

## 2023-08-18 DIAGNOSIS — M54.50 CHRONIC MIDLINE LOW BACK PAIN WITHOUT SCIATICA: ICD-10-CM

## 2023-08-18 DIAGNOSIS — Z11.59 NEED FOR HEPATITIS C SCREENING TEST: ICD-10-CM

## 2023-08-18 PROCEDURE — 99214 OFFICE O/P EST MOD 30 MIN: CPT | Performed by: NURSE PRACTITIONER

## 2023-08-18 PROCEDURE — 1036F TOBACCO NON-USER: CPT | Performed by: NURSE PRACTITIONER

## 2023-08-18 PROCEDURE — G8400 PT W/DXA NO RESULTS DOC: HCPCS | Performed by: NURSE PRACTITIONER

## 2023-08-18 PROCEDURE — 1123F ACP DISCUSS/DSCN MKR DOCD: CPT | Performed by: NURSE PRACTITIONER

## 2023-08-18 PROCEDURE — 3017F COLORECTAL CA SCREEN DOC REV: CPT | Performed by: NURSE PRACTITIONER

## 2023-08-18 PROCEDURE — G8420 CALC BMI NORM PARAMETERS: HCPCS | Performed by: NURSE PRACTITIONER

## 2023-08-18 PROCEDURE — 1090F PRES/ABSN URINE INCON ASSESS: CPT | Performed by: NURSE PRACTITIONER

## 2023-08-18 PROCEDURE — G8427 DOCREV CUR MEDS BY ELIG CLIN: HCPCS | Performed by: NURSE PRACTITIONER

## 2023-08-18 PROCEDURE — G0439 PPPS, SUBSEQ VISIT: HCPCS | Performed by: NURSE PRACTITIONER

## 2023-08-18 RX ORDER — MONTELUKAST SODIUM 10 MG/1
10 TABLET ORAL DAILY
Qty: 93 TABLET | Refills: 3 | Status: SHIPPED | OUTPATIENT
Start: 2023-08-18

## 2023-08-18 RX ORDER — FLUTICASONE PROPIONATE 50 MCG
2 SPRAY, SUSPENSION (ML) NASAL DAILY PRN
Qty: 16 G | Refills: 5 | Status: SHIPPED | OUTPATIENT
Start: 2023-08-18

## 2023-08-18 NOTE — PATIENT INSTRUCTIONS
Advance Directives: Care Instructions  Overview  An advance directive is a legal way to state your wishes at the end of your life. It tells your family and your doctor what to do if you can't say what you want. There are two main types of advance directives. You can change them any time your wishes change. Living will. This form tells your family and your doctor your wishes about life support and other treatment. The form is also called a declaration. Medical power of . This form lets you name a person to make treatment decisions for you when you can't speak for yourself. This person is called a health care agent (health care proxy, health care surrogate). The form is also called a durable power of  for health care. If you do not have an advance directive, decisions about your medical care may be made by a family member, or by a doctor or a  who doesn't know you. It may help to think of an advance directive as a gift to the people who care for you. If you have one, they won't have to make tough decisions by themselves. For more information, including forms for your state, see the [address] [place] [address] website (www.caringinfo.org/planning/advance-directives/). Follow-up care is a key part of your treatment and safety. Be sure to make and go to all appointments, and call your doctor if you are having problems. It's also a good idea to know your test results and keep a list of the medicines you take. What should you include in an advance directive? Many states have a unique advance directive form. (It may ask you to address specific issues.) Or you might use a universal form that's approved by many states. If your form doesn't tell you what to address, it may be hard to know what to include in your advance directive. Use the questions below to help you get started. Who do you want to make decisions about your medical care if you are not able to?   What life-support measures do you want if you

## 2023-08-22 PROBLEM — N88.8 CYST OF CERVIX: Status: RESOLVED | Noted: 2022-03-01 | Resolved: 2023-08-22

## 2023-08-22 PROBLEM — J30.89 ENVIRONMENTAL AND SEASONAL ALLERGIES: Status: ACTIVE | Noted: 2023-08-22

## 2023-08-22 PROBLEM — N95.2 VAGINAL ATROPHY: Status: ACTIVE | Noted: 2023-08-22

## 2023-08-22 PROBLEM — Z00.00 MEDICARE ANNUAL WELLNESS VISIT, SUBSEQUENT: Status: ACTIVE | Noted: 2023-08-22

## 2023-08-22 PROBLEM — Z87.442 HISTORY OF KIDNEY STONES: Status: RESOLVED | Noted: 2021-08-19 | Resolved: 2023-08-22

## 2023-08-22 PROBLEM — N39.3 STRESS INCONTINENCE: Status: ACTIVE | Noted: 2022-08-19

## 2023-08-22 RX ORDER — ESTRADIOL 10 UG/1
INSERT VAGINAL
Qty: 24 TABLET | Refills: 3 | Status: SHIPPED | OUTPATIENT
Start: 2023-08-22

## 2023-08-22 RX ORDER — ESTRADIOL 0.1 MG/D
1 FILM, EXTENDED RELEASE TRANSDERMAL
Qty: 24 PATCH | Refills: 3 | Status: SHIPPED | OUTPATIENT
Start: 2023-08-24

## 2023-08-22 NOTE — ASSESSMENT & PLAN NOTE
Well-controlled, continue current medications   -95; HDL 84; -144    Reduce fried fatty or oily foods in diet  Limit red meats, processed foods, and alcohol. Limit fast food  Increase physical activity to 60 minutes of moderate intensity aerobic exercise per week. Increase water intake  Reduce alcohol intake    How to raise HDL if low:  Consume oats, beans, legumes, olive oil, whole grains, nuts, seeds, fatty fish, and berries.   Lose weight/fat  Reduce alcohol consumption  Smoking cessation

## 2023-08-22 NOTE — ASSESSMENT & PLAN NOTE
Well-controlled, continue current medications   Cont Voltaren as prescribed  F/u with ortho/podiatry as scheduled

## 2023-08-22 NOTE — ASSESSMENT & PLAN NOTE
[facility] Wellness: Reviewed all HM and ordered tests/imaging appropriately. Reviewed care teams and medications with updates.      Review Data with Patient:  -95; HDL 84; -144  CMP ok    HM:  Tdap and shingrix-local pharm  Dexa order placed   Mammo order placed  Colonoscopy due 9/2027    Specialists:  Dr Corrinne High, podiatry  Dr Argentina Garcia

## 2023-09-14 ENCOUNTER — TELEPHONE (OUTPATIENT)
Age: 73
End: 2023-09-14

## 2023-09-14 DIAGNOSIS — U07.1 POSITIVE SELF-ADMINISTERED ANTIGEN TEST FOR COVID-19: Primary | ICD-10-CM

## 2023-09-14 NOTE — TELEPHONE ENCOUNTER
Patient reached and given message, she verbalized understanding and has no further questions at this time.

## 2023-09-14 NOTE — TELEPHONE ENCOUNTER
Have her take delsym, over the counter, along with Vit C and Zinc supplements to help aid her immune system.

## 2023-09-14 NOTE — TELEPHONE ENCOUNTER
Patient is calling stating her  has COVID and she started having symptoms last night. She is having a sore throat and congestion. She tested positive for COVID this morning. She wants to know if Dr. Charlee Brown can send rx for Paxlovid to [facility] on [address]. No spots available for a VV.

## 2023-09-14 NOTE — TELEPHONE ENCOUNTER
Diagnosis Orders   1.  Positive self-administered antigen test for COVID-19  nirmatrelvir/ritonavir 300/100 (PAXLOVID) 20 x 150 MG & 10 x 100MG TBPK

## 2023-09-20 ENCOUNTER — TELEPHONE (OUTPATIENT)
Age: 73
End: 2023-09-20

## 2023-09-20 DIAGNOSIS — R11.0 NAUSEA: Primary | ICD-10-CM

## 2023-09-20 RX ORDER — ONDANSETRON 4 MG/1
4 TABLET, FILM COATED ORAL 3 TIMES DAILY PRN
Qty: 30 TABLET | Refills: 0 | Status: SHIPPED | OUTPATIENT
Start: 2023-09-20

## 2023-09-20 NOTE — TELEPHONE ENCOUNTER
All covid sx have improved ie cough, sore throat. Started taking Paxlovid on Sunday. Developed nausea, h/a, diarrhea, bad taste mouth, overall feeling poor; started Tuesday. Stop paxlovid. Initiate zofran for nausea. OTC immodium for diarrhea. Stay well hydrated. Get ;lots of rest. Cont vit c.      Diagnosis Orders   1.  Nausea  ondansetron (ZOFRAN) 4 MG tablet

## 2023-09-21 PROBLEM — Z00.00 MEDICARE ANNUAL WELLNESS VISIT, SUBSEQUENT: Status: RESOLVED | Noted: 2023-08-22 | Resolved: 2023-09-21

## 2023-09-29 ENCOUNTER — TELEPHONE (OUTPATIENT)
Age: 73
End: 2023-09-29

## 2023-09-29 DIAGNOSIS — T75.3XXS MOTION SICKNESS, SEQUELA: Primary | ICD-10-CM

## 2023-09-29 NOTE — TELEPHONE ENCOUNTER
Pt is gong on a cruise and asking if  could send a script for sea sickness  to her pharmacy   [address] [place] [address]

## 2023-10-02 RX ORDER — SCOLOPAMINE TRANSDERMAL SYSTEM 1 MG/1
1 PATCH, EXTENDED RELEASE TRANSDERMAL
Qty: 3 PATCH | Refills: 0 | Status: SHIPPED | OUTPATIENT
Start: 2023-10-02

## 2023-10-02 NOTE — TELEPHONE ENCOUNTER
Requested Prescriptions     Signed Prescriptions Disp Refills    scopolamine (TRANSDERM-SCOP) transdermal patch 3 patch 0     Sig: Place 1 patch onto the skin every 72 hours During cruise     Authorizing Provider: Tina Lowry

## 2023-10-04 ENCOUNTER — HOSPITAL ENCOUNTER (OUTPATIENT)
Facility: HOSPITAL | Age: 73
Discharge: HOME | End: 2023-10-07
Payer: MEDICARE

## 2023-10-04 DIAGNOSIS — Z78.0 ASYMPTOMATIC MENOPAUSAL STATE: ICD-10-CM

## 2023-10-04 DIAGNOSIS — Z00.00 MEDICARE ANNUAL WELLNESS VISIT, SUBSEQUENT: ICD-10-CM

## 2023-10-04 PROCEDURE — 77080 DXA BONE DENSITY AXIAL: CPT

## 2023-12-01 ENCOUNTER — HOSPITAL ENCOUNTER (OUTPATIENT)
Facility: HOSPITAL | Age: 73
End: 2023-12-01
Payer: MEDICARE

## 2023-12-01 VITALS — WEIGHT: 123 LBS | HEIGHT: 67 IN | BODY MASS INDEX: 19.3 KG/M2

## 2023-12-01 DIAGNOSIS — Z12.31 BREAST CANCER SCREENING BY MAMMOGRAM: ICD-10-CM

## 2023-12-01 PROCEDURE — 77063 BREAST TOMOSYNTHESIS BI: CPT

## 2024-07-22 DIAGNOSIS — J30.89 ENVIRONMENTAL AND SEASONAL ALLERGIES: ICD-10-CM

## 2024-07-22 RX ORDER — FLUTICASONE PROPIONATE 50 MCG
SPRAY, SUSPENSION (ML) NASAL
Qty: 16 G | Refills: 5 | OUTPATIENT
Start: 2024-07-22

## 2024-07-25 DIAGNOSIS — J30.89 ENVIRONMENTAL AND SEASONAL ALLERGIES: ICD-10-CM

## 2024-07-25 RX ORDER — FLUTICASONE PROPIONATE 50 MCG
SPRAY, SUSPENSION (ML) NASAL
Qty: 16 G | Refills: 5 | Status: SHIPPED | OUTPATIENT
Start: 2024-07-25

## 2024-08-09 ENCOUNTER — HOSPITAL ENCOUNTER (OUTPATIENT)
Facility: HOSPITAL | Age: 74
Setting detail: SPECIMEN
End: 2024-08-09
Payer: MEDICARE

## 2024-08-09 DIAGNOSIS — Z11.59 NEED FOR HEPATITIS C SCREENING TEST: ICD-10-CM

## 2024-08-09 DIAGNOSIS — E78.2 MIXED HYPERLIPIDEMIA: ICD-10-CM

## 2024-08-09 LAB
ALBUMIN SERPL-MCNC: 3.7 G/DL (ref 3.4–5)
ALBUMIN/GLOB SERPL: 1.4 (ref 0.8–1.7)
ALP SERPL-CCNC: 59 U/L (ref 45–117)
ALT SERPL-CCNC: 22 U/L (ref 13–56)
ANION GAP SERPL CALC-SCNC: 3 MMOL/L (ref 3–18)
AST SERPL-CCNC: 17 U/L (ref 10–38)
BILIRUB SERPL-MCNC: 0.6 MG/DL (ref 0.2–1)
BUN SERPL-MCNC: 17 MG/DL (ref 7–18)
BUN/CREAT SERPL: 30 (ref 12–20)
CALCIUM SERPL-MCNC: 9 MG/DL (ref 8.5–10.1)
CHLORIDE SERPL-SCNC: 105 MMOL/L (ref 100–111)
CHOLEST SERPL-MCNC: 247 MG/DL
CO2 SERPL-SCNC: 30 MMOL/L (ref 21–32)
CREAT SERPL-MCNC: 0.56 MG/DL (ref 0.6–1.3)
GLOBULIN SER CALC-MCNC: 2.6 G/DL (ref 2–4)
GLUCOSE SERPL-MCNC: 86 MG/DL (ref 74–99)
HDLC SERPL-MCNC: 84 MG/DL (ref 40–60)
HDLC SERPL: 2.9 (ref 0–5)
LDLC SERPL CALC-MCNC: 142 MG/DL (ref 0–100)
LIPID PANEL: ABNORMAL
POTASSIUM SERPL-SCNC: 4.4 MMOL/L (ref 3.5–5.5)
PROT SERPL-MCNC: 6.3 G/DL (ref 6.4–8.2)
SODIUM SERPL-SCNC: 138 MMOL/L (ref 136–145)
TRIGL SERPL-MCNC: 105 MG/DL
VLDLC SERPL CALC-MCNC: 21 MG/DL

## 2024-08-09 PROCEDURE — 80053 COMPREHEN METABOLIC PANEL: CPT

## 2024-08-09 PROCEDURE — 36415 COLL VENOUS BLD VENIPUNCTURE: CPT

## 2024-08-09 PROCEDURE — 80061 LIPID PANEL: CPT

## 2024-08-09 PROCEDURE — 80074 ACUTE HEPATITIS PANEL: CPT

## 2024-08-12 LAB
-: NORMAL
HAV IGM SER QL: NEGATIVE
HBV CORE IGM SER QL: NEGATIVE
HBV SURFACE AG SER QL: <0.1 INDEX
HBV SURFACE AG SER QL: NEGATIVE
HCV AB SER IA-ACNC: <0.02 INDEX
HCV AB SERPL QL IA: NEGATIVE
HEPATITIS C COMMENT: NORMAL

## 2024-08-13 SDOH — HEALTH STABILITY: PHYSICAL HEALTH: ON AVERAGE, HOW MANY DAYS PER WEEK DO YOU ENGAGE IN MODERATE TO STRENUOUS EXERCISE (LIKE A BRISK WALK)?: 7 DAYS

## 2024-08-13 SDOH — HEALTH STABILITY: PHYSICAL HEALTH: ON AVERAGE, HOW MANY MINUTES DO YOU ENGAGE IN EXERCISE AT THIS LEVEL?: 90 MIN

## 2024-08-13 ASSESSMENT — LIFESTYLE VARIABLES
HOW MANY STANDARD DRINKS CONTAINING ALCOHOL DO YOU HAVE ON A TYPICAL DAY: 1
HOW OFTEN DO YOU HAVE A DRINK CONTAINING ALCOHOL: 2-3 TIMES A WEEK
HOW OFTEN DO YOU HAVE SIX OR MORE DRINKS ON ONE OCCASION: 1
HOW MANY STANDARD DRINKS CONTAINING ALCOHOL DO YOU HAVE ON A TYPICAL DAY: 1 OR 2
HOW OFTEN DO YOU HAVE A DRINK CONTAINING ALCOHOL: 4

## 2024-08-13 ASSESSMENT — PATIENT HEALTH QUESTIONNAIRE - PHQ9
SUM OF ALL RESPONSES TO PHQ QUESTIONS 1-9: 0
1. LITTLE INTEREST OR PLEASURE IN DOING THINGS: NOT AT ALL
SUM OF ALL RESPONSES TO PHQ QUESTIONS 1-9: 0
SUM OF ALL RESPONSES TO PHQ9 QUESTIONS 1 & 2: 0

## 2024-08-16 ENCOUNTER — OFFICE VISIT (OUTPATIENT)
Facility: CLINIC | Age: 74
End: 2024-08-16
Payer: MEDICARE

## 2024-08-16 VITALS
TEMPERATURE: 97.5 F | WEIGHT: 124 LBS | OXYGEN SATURATION: 100 % | SYSTOLIC BLOOD PRESSURE: 120 MMHG | BODY MASS INDEX: 18.79 KG/M2 | RESPIRATION RATE: 16 BRPM | HEIGHT: 68 IN | HEART RATE: 80 BPM | DIASTOLIC BLOOD PRESSURE: 70 MMHG

## 2024-08-16 DIAGNOSIS — N39.3 STRESS INCONTINENCE: ICD-10-CM

## 2024-08-16 DIAGNOSIS — N95.1 MENOPAUSAL SYMPTOMS: ICD-10-CM

## 2024-08-16 DIAGNOSIS — M54.50 CHRONIC MIDLINE LOW BACK PAIN WITHOUT SCIATICA: ICD-10-CM

## 2024-08-16 DIAGNOSIS — N95.2 VAGINAL ATROPHY: ICD-10-CM

## 2024-08-16 DIAGNOSIS — Z00.00 MEDICARE ANNUAL WELLNESS VISIT, SUBSEQUENT: Primary | ICD-10-CM

## 2024-08-16 DIAGNOSIS — G89.29 CHRONIC MIDLINE LOW BACK PAIN WITHOUT SCIATICA: ICD-10-CM

## 2024-08-16 DIAGNOSIS — J30.89 ENVIRONMENTAL AND SEASONAL ALLERGIES: ICD-10-CM

## 2024-08-16 DIAGNOSIS — Z23 NEED FOR PROPHYLACTIC VACCINATION WITH TETANUS-DIPHTHERIA (TD): ICD-10-CM

## 2024-08-16 DIAGNOSIS — Z71.89 ACP (ADVANCE CARE PLANNING): ICD-10-CM

## 2024-08-16 DIAGNOSIS — E78.2 MIXED HYPERLIPIDEMIA: ICD-10-CM

## 2024-08-16 DIAGNOSIS — M15.9 PRIMARY OSTEOARTHRITIS INVOLVING MULTIPLE JOINTS: ICD-10-CM

## 2024-08-16 PROCEDURE — 3017F COLORECTAL CA SCREEN DOC REV: CPT | Performed by: NURSE PRACTITIONER

## 2024-08-16 PROCEDURE — G8420 CALC BMI NORM PARAMETERS: HCPCS | Performed by: NURSE PRACTITIONER

## 2024-08-16 PROCEDURE — 1123F ACP DISCUSS/DSCN MKR DOCD: CPT | Performed by: NURSE PRACTITIONER

## 2024-08-16 PROCEDURE — G8428 CUR MEDS NOT DOCUMENT: HCPCS | Performed by: NURSE PRACTITIONER

## 2024-08-16 PROCEDURE — 99497 ADVNCD CARE PLAN 30 MIN: CPT | Performed by: NURSE PRACTITIONER

## 2024-08-16 PROCEDURE — G8399 PT W/DXA RESULTS DOCUMENT: HCPCS | Performed by: NURSE PRACTITIONER

## 2024-08-16 PROCEDURE — 1036F TOBACCO NON-USER: CPT | Performed by: NURSE PRACTITIONER

## 2024-08-16 PROCEDURE — G0439 PPPS, SUBSEQ VISIT: HCPCS | Performed by: NURSE PRACTITIONER

## 2024-08-16 PROCEDURE — 99214 OFFICE O/P EST MOD 30 MIN: CPT | Performed by: NURSE PRACTITIONER

## 2024-08-16 PROCEDURE — 1090F PRES/ABSN URINE INCON ASSESS: CPT | Performed by: NURSE PRACTITIONER

## 2024-08-16 RX ORDER — ESTRADIOL 0.1 MG/D
1 FILM, EXTENDED RELEASE TRANSDERMAL
Qty: 24 PATCH | Refills: 3 | Status: SHIPPED | OUTPATIENT
Start: 2024-08-19

## 2024-08-16 RX ORDER — TETANUS AND DIPHTHERIA TOXOIDS ADSORBED 2; 2 [LF]/.5ML; [LF]/.5ML
0.5 INJECTION INTRAMUSCULAR ONCE
Qty: 0.5 ML | Refills: 0 | Status: SHIPPED | OUTPATIENT
Start: 2024-08-16 | End: 2024-08-16

## 2024-08-16 RX ORDER — MONTELUKAST SODIUM 10 MG/1
10 TABLET ORAL DAILY
Qty: 100 TABLET | Refills: 3 | Status: SHIPPED | OUTPATIENT
Start: 2024-08-16

## 2024-08-16 RX ORDER — ESTRADIOL 10 UG/1
INSERT VAGINAL
Qty: 24 TABLET | Refills: 3 | Status: SHIPPED | OUTPATIENT
Start: 2024-08-16

## 2024-08-16 RX ORDER — RESPIRATORY SYNCYTIAL VISUS VACCINE RECOMBINANT, ADJUVANTED 120MCG/0.5
0.5 KIT INTRAMUSCULAR ONCE
Qty: 0.5 ML | Refills: 0 | Status: SHIPPED | OUTPATIENT
Start: 2024-08-16 | End: 2024-08-16

## 2024-08-16 RX ORDER — FLUTICASONE PROPIONATE 50 MCG
1 SPRAY, SUSPENSION (ML) NASAL DAILY PRN
Qty: 16 G | Refills: 5 | Status: SHIPPED | OUTPATIENT
Start: 2024-08-16

## 2024-08-16 SDOH — ECONOMIC STABILITY: TRANSPORTATION INSECURITY
IN THE PAST 12 MONTHS, HAS THE LACK OF TRANSPORTATION KEPT YOU FROM MEDICAL APPOINTMENTS OR FROM GETTING MEDICATIONS?: NO

## 2024-08-16 SDOH — ECONOMIC STABILITY: FOOD INSECURITY: WITHIN THE PAST 12 MONTHS, THE FOOD YOU BOUGHT JUST DIDN'T LAST AND YOU DIDN'T HAVE MONEY TO GET MORE.: NEVER TRUE

## 2024-08-16 SDOH — ECONOMIC STABILITY: INCOME INSECURITY: IN THE LAST 12 MONTHS, WAS THERE A TIME WHEN YOU WERE NOT ABLE TO PAY THE MORTGAGE OR RENT ON TIME?: NO

## 2024-08-16 SDOH — ECONOMIC STABILITY: FOOD INSECURITY: WITHIN THE PAST 12 MONTHS, YOU WORRIED THAT YOUR FOOD WOULD RUN OUT BEFORE YOU GOT MONEY TO BUY MORE.: NEVER TRUE

## 2024-08-16 ASSESSMENT — SOCIAL DETERMINANTS OF HEALTH (SDOH)
WITHIN THE LAST YEAR, HAVE TO BEEN RAPED OR FORCED TO HAVE ANY KIND OF SEXUAL ACTIVITY BY YOUR PARTNER OR EX-PARTNER?: NO
WITHIN THE LAST YEAR, HAVE YOU BEEN HUMILIATED OR EMOTIONALLY ABUSED IN OTHER WAYS BY YOUR PARTNER OR EX-PARTNER?: NO
WITHIN THE LAST YEAR, HAVE YOU BEEN KICKED, HIT, SLAPPED, OR OTHERWISE PHYSICALLY HURT BY YOUR PARTNER OR EX-PARTNER?: NO
WITHIN THE LAST YEAR, HAVE YOU BEEN AFRAID OF YOUR PARTNER OR EX-PARTNER?: NO
HOW HARD IS IT FOR YOU TO PAY FOR THE VERY BASICS LIKE FOOD, HOUSING, MEDICAL CARE, AND HEATING?: NOT HARD AT ALL

## 2024-08-16 NOTE — PATIENT INSTRUCTIONS
other health problems such as diabetes, high blood pressure, and high cholesterol. If you think you may have a problem with alcohol or drug use, talk to your doctor.   Medicines    Take your medicines exactly as prescribed. Call your doctor if you think you are having a problem with your medicine.     If your doctor recommends aspirin, take the amount directed each day. Make sure you take aspirin and not another kind of pain reliever, such as acetaminophen (Tylenol).   When should you call for help?   Call 911 if you have symptoms of a heart attack. These may include:    Chest pain or pressure, or a strange feeling in the chest.     Sweating.     Shortness of breath.     Pain, pressure, or a strange feeling in the back, neck, jaw, or upper belly or in one or both shoulders or arms.     Lightheadedness or sudden weakness.     A fast or irregular heartbeat.   After you call 911, the  may tell you to chew 1 adult-strength or 2 to 4 low-dose aspirin. Wait for an ambulance. Do not try to drive yourself.  Watch closely for changes in your health, and be sure to contact your doctor if you have any problems.  Where can you learn more?  Go to https://www.[employer].net/patientEd and enter F075 to learn more about \"A Healthy Heart: Care Instructions.\"  Current as of: June 24, 2023  Content Version: 14.1  © 2006-2024 [employer].   Care instructions adapted under license by [employer]. If you have questions about a medical condition or this instruction, always ask your healthcare professional. [employer] disclaims any warranty or liability for your use of this information.      Personalized Preventive Plan for Estephanie Flores - 8/16/2024  Medicare offers a range of preventive health benefits. Some of the tests and screenings are paid in full while other may be subject to a deductible, co-insurance, and/or copay.    Some of these benefits include a comprehensive review of your medical

## 2024-08-16 NOTE — ACP (ADVANCE CARE PLANNING)
Advance Care Planning     Advance Care Planning (ACP) Physician/NP/PA Conversation    Date of Conversation: 8/16/2024  Conducted with: Patient with Decision Making Capacity    Healthcare Decision Maker:      Primary Decision Maker: Pavan Flores. - Spouse - [phone]    Click here to complete Healthcare Decision Makers including selection of the Healthcare Decision Maker Relationship (ie \"Primary\")  Today we documented Decision Maker(s) consistent with Legal Next of Kin hierarchy.    Care Preferences:    Hospitalization:  \"If your health worsens and it becomes clear that your chance of recovery is unlikely, what would be your preference regarding hospitalization?\"  The patient would prefer comfort-focused treatment without hospitalization.    Ventilation:  \"If you were unable to breath on your own and your chance of recovery was unlikely, what would be your preference about the use of a ventilator (breathing machine) if it was available to you?\"  The patient would NOT desire the use of a ventilator.    Resuscitation:  \"In the event your heart stopped as a result of an underlying serious health condition, would you want attempts made to restart your heart, or would you prefer a natural death?\"  Yes, attempt to resuscitate.    ventilation preferences, hospitalization preferences, and resuscitation preferences    Conversation Outcomes / Follow-Up Plan:  ACP in process - completing/providing documents  Reviewed DNR/DNI and patient elects Full Code (Attempt Resuscitation)    Length of Voluntary ACP Conversation in minutes:  16 minutes    Faye Leslie DNP               In August 2022 patient stated she wanted to be a DNR.  Referred patient to ACP and after 3 attempts they were unable to reach her to complete her DNR.

## 2024-08-16 NOTE — PROGRESS NOTES
[facility]  [address]  [address]  [place] [address]  [phone] office/[phone] fax    8/16/2024    Estephanie Flores 1950 is a pleasant White (non-) female. Retired audiologists.      History of Present Illness  The patient presents for an annual wellness visit.    She continues to see Dr. Saldivar for podiatry and Dr. Beltran for pain management, although she is not currently on any pain medications.     Two weeks ago, she experienced swelling in her left hand and fingers, which has since subsided. She sought care from Dr. Noble, an orthopedic hand specialist at [facility], who prescribed Keflex and ibuprofen 800 mg three times a day for two weeks. The cause of the swelling was uncertain, possibly cellulitis or arthritis, but it has improved with the medication.    She also saw her optometrist, Dr. Earl, two weeks ago.    She continues to use estradiol patches      She no longer takes Zofran for nausea.    She reports no nausea, vomiting, diarrhea, falls, or head injuries. Her appetite is good, and she enjoys playing golf and going to the gym.    She is scheduled for her next colonoscopy in 09/2025.    Stress incontinence. Leaking when walking, sneezing, laughing. Bulkamid completed 5/17/23 w/o difficulty. No more stress incont. Dr Cortney Llamas performed procedure. No longer seeing GYN.     Menopausal syndrome. Continues taking estradiol 0.1 mg/24-hour patch and Vagifem 10 mg tablet vaginally as directed.     Seasonal allergies. Takes Singulair daily, Flonase and Zyrtec as needed.  Tolerating these therapies well. She requests refills for Flonase and Singulair for her allergies    Dyslipidemia. She is aware of her elevated LDL along with an elevated HDL.  She eats healthy and exercises regularly. Not seeking medication.     Chronic back pain. Voltaren gel. She sees Dr. Navarro at [facility] [place].  Sees as

## 2024-08-16 NOTE — PROGRESS NOTES
Estephanie Flores presents today for   Chief Complaint   Patient presents with    Medicare AWV           \"Have you been to the ER, urgent care clinic since your last visit?  Hospitalized since your last visit?\"    NO    “Have you seen or consulted any other health care providers outside of [facility] since your last visit?”    NO

## 2024-08-16 NOTE — PROGRESS NOTES
Medicare Annual Wellness Visit    Estephanie Flores is here for Medicare AWV    Assessment & Plan   Medicare annual wellness visit, subsequent  Assessment & Plan:  [facility] Wellness: Reviewed all HM and ordered tests/imaging appropriately. Reviewed care teams and medications with updates.     Reviewed and updated Healthcare Decision maker    She is due for her COVID-19, influenza, Tdap, and RSV vaccines. She is also due for a colonoscopy next September.    Specialists:  Dr Lofton, podiatry  Dr Navarro, pain mgt  Dr Earl, opt  Orders:  -     Full code  -     diptheria-tetanus toxoids (TDVAX) 2-2 LF/0.5ML injection; Inject 0.5 mLs into the muscle once for 1 dose, Disp-0.5 mL, R-0Print  -     respiratory syncytial vaccine, adjuvanted (AREXVY) 120 MCG/0.5ML injection; Inject 0.5 mLs into the muscle once for 1 dose, Disp-0.5 mL, R-0Print  ACP (advance care planning)  Assessment & Plan:  Advanced Care Planning: Patient educated on the importance of an advanced care plan and paperwork was given to the patient today. Asked patient to read over the information and bring back at next appointment.    elects Full Code (Attempt Resuscitation)  Orders:  -     Full code  Need for prophylactic vaccination with tetanus-diphtheria (Td)  -     diptheria-tetanus toxoids (TDVAX) 2-2 LF/0.5ML injection; Inject 0.5 mLs into the muscle once for 1 dose, Disp-0.5 mL, R-0Print  Vaginal atrophy  Assessment & Plan:  Pt no longer seeing GYN  Refill estradiol patch and tablets.  Therapy effective.  Orders:  -     Estradiol (VAGIFEM) 10 MCG TABS vaginal tablet; Insert 1 tablet by vaginal route twice weekly, Disp-24 tablet, R-3Normal  -     estradiol (VIVELLE) 0.1 MG/24HR; Place 1 patch onto the skin Twice a Week, Disp-24 patch, R-3Normal  Menopausal symptoms  -     Estradiol (VAGIFEM) 10 MCG TABS vaginal tablet; Insert 1 tablet by vaginal route twice weekly, Disp-24 tablet, R-3Normal  -     estradiol (VIVELLE) 0.1 MG/24HR; Place 1 patch onto the skin

## 2024-08-19 PROBLEM — Z71.89 ACP (ADVANCE CARE PLANNING): Status: ACTIVE | Noted: 2024-08-19

## 2024-08-19 NOTE — ASSESSMENT & PLAN NOTE
Advanced Care Planning: Patient educated on the importance of an advanced care plan and paperwork was given to the patient today. Asked patient to read over the information and bring back at next appointment.    elects Full Code (Attempt Resuscitation)

## 2024-08-19 NOTE — ASSESSMENT & PLAN NOTE
Borderline controlled, lifestyle modifications recommended  ; HDL 84; -142  Pt not seeking medication therapy at this time  Shady level 12m  Recommended fiber the patient is a natural way to decrease cholesterol    Reduce fried fatty or oily foods in diet  Limit red meats, processed foods, and alcohol.    Limit fast food  Increase physical activity to 60 minutes of moderate intensity aerobic exercise per week.  Increase water intake  Reduce alcohol intake    How to raise HDL if low:  Consume oats, beans, legumes, olive oil, whole grains, nuts, seeds, fatty fish, and berries.  Lose weight/fat  Reduce alcohol consumption  Smoking cessation

## 2024-08-19 NOTE — ASSESSMENT & PLAN NOTE
She experienced swelling in her left hand two weeks ago, which has since resolved. She was seen by Dr. Noble, an orthopedic hand specialist, who prescribed Keflex and ibuprofen 800 mg three times a day for two weeks. The swelling has subsided. If the swelling recurs, she has been given another medication to try before considering a steroid shot.

## 2024-08-19 NOTE — ASSESSMENT & PLAN NOTE
[facility] Wellness: Reviewed all HM and ordered tests/imaging appropriately. Reviewed care teams and medications with updates.     Reviewed and updated Healthcare Decision maker    She is due for her COVID-19, influenza, Tdap, and RSV vaccines. She is also due for a colonoscopy next September.    Specialists:  Dr Lofton, podiatry  Dr Navarro, pain mgt  Dr Earl, opt

## 2024-08-19 NOTE — ASSESSMENT & PLAN NOTE
Well-controlled, continue current medications    Singulair and zyrtec   Avoid allergens (strong smells, animals, cigarettes or carpeted areas)  Avoid outdoors when pollen level is high.

## 2024-09-18 PROBLEM — Z00.00 MEDICARE ANNUAL WELLNESS VISIT, SUBSEQUENT: Status: RESOLVED | Noted: 2023-08-22 | Resolved: 2024-09-18

## 2024-10-01 ENCOUNTER — TELEMEDICINE (OUTPATIENT)
Facility: CLINIC | Age: 74
End: 2024-10-01

## 2024-10-01 ENCOUNTER — HOSPITAL ENCOUNTER (OUTPATIENT)
Facility: HOSPITAL | Age: 74
Setting detail: SPECIMEN
Discharge: HOME | End: 2024-10-04
Payer: MEDICARE

## 2024-10-01 ENCOUNTER — TELEPHONE (OUTPATIENT)
Facility: CLINIC | Age: 74
End: 2024-10-01

## 2024-10-01 DIAGNOSIS — N39.0 URINARY TRACT INFECTION WITHOUT HEMATURIA, SITE UNSPECIFIED: Primary | ICD-10-CM

## 2024-10-01 DIAGNOSIS — N39.0 URINARY TRACT INFECTION WITHOUT HEMATURIA, SITE UNSPECIFIED: ICD-10-CM

## 2024-10-01 LAB
BILIRUBIN, URINE, POC: NEGATIVE
BLOOD URINE, POC: ABNORMAL
GLUCOSE URINE, POC: NEGATIVE
KETONES, URINE, POC: NEGATIVE
LEUKOCYTE ESTERASE, URINE, POC: ABNORMAL
NITRITE, URINE, POC: POSITIVE
PH, URINE, POC: 5.5 (ref 4.6–8)
PROTEIN,URINE, POC: ABNORMAL
SPECIFIC GRAVITY, URINE, POC: 1.02 (ref 1–1.03)
URINALYSIS CLARITY, POC: ABNORMAL
URINALYSIS COLOR, POC: YELLOW
UROBILINOGEN, POC: ABNORMAL

## 2024-10-01 PROCEDURE — 87088 URINE BACTERIA CULTURE: CPT

## 2024-10-01 PROCEDURE — 87086 URINE CULTURE/COLONY COUNT: CPT

## 2024-10-01 PROCEDURE — 87186 SC STD MICRODIL/AGAR DIL: CPT

## 2024-10-01 RX ORDER — NITROFURANTOIN 25; 75 MG/1; MG/1
100 CAPSULE ORAL 2 TIMES DAILY
Qty: 14 CAPSULE | Refills: 0 | Status: SHIPPED | OUTPATIENT
Start: 2024-10-01 | End: 2024-10-08

## 2024-10-01 RX ORDER — PHENAZOPYRIDINE HYDROCHLORIDE 100 MG/1
100 TABLET, FILM COATED ORAL 3 TIMES DAILY PRN
Qty: 6 TABLET | Refills: 0 | Status: SHIPPED | OUTPATIENT
Start: 2024-10-01 | End: 2024-10-01

## 2024-10-01 RX ORDER — PHENAZOPYRIDINE HYDROCHLORIDE 100 MG/1
100 TABLET, FILM COATED ORAL 3 TIMES DAILY PRN
Qty: 9 TABLET | Refills: 0 | Status: SHIPPED | OUTPATIENT
Start: 2024-10-01 | End: 2024-10-04

## 2024-10-01 NOTE — PROGRESS NOTES
[facility] [address]  [address]  [place] [address]  [phone] office/[phone] fax    10/1/2024    Estephanie Flores 1950 is a pleasant White (non-) female.       History of Present Illness  The patient presents via virtual visit for evaluation of urinary symptoms.    She reports experiencing a burning sensation during urination, along with a feeling of pressure and urgency. These symptoms began yesterday afternoon, initially presenting as a sense of urgency, but by this morning had escalated to painful urination. She recalls being treated for a urinary tract infection (UTI) in the past, but it was a considerable time ago.      PHYSICAL EXAMINATION:  [ INSTRUCTIONS:  \"[x]\" Indicates a positive item  \"[]\" Indicates a negative item  -- DELETE ALL ITEMS NOT EXAMINED]      Constitutional: [x] Appears well-developed and well-nourished [] No apparent distress      [] Abnormal -     Mental status: [x] Alert and awake  [x] Oriented to person/place/time [x] Able to follow commands    [] Abnormal -     Eyes:   EOM    [x]  Normal    [] Abnormal -   Sclera  [x]  Normal    [] Abnormal -          Discharge [x]  None visible   [] Abnormal -     HENT, Neck: [x] Normocephalic, atraumatic  [] Abnormal   [x] Mouth/Throat: Mucous membranes are moist    Pulmonary/Chest: [x] Respiratory effort normal   [x] No visualized signs of difficulty breathing or respiratory distress        [] Abnormal -      Musculoskeletal:   [] Normal gait with no signs of ataxia         [x] Normal range of motion of neck        [] Abnormal -         Skin:        [x] No significant exanthematous lesions or discoloration noted on facial skin         [] Abnormal -            Psychiatric:       [x] Normal Affect [] Abnormal -        [] No Hallucinations      Results      Assessment & Plan    1. Urinary tract infection without hematuria, site unspecified  Assessment & Plan:  POC UA reveals blood 2+, protein

## 2024-10-01 NOTE — TELEPHONE ENCOUNTER
Pt called in because she's experiencing a UTI. Pt wants to know if there's something that can be prescribed for her or if she needs to make an appt to be seen. Please advise.

## 2024-10-04 LAB
BACTERIA SPEC CULT: ABNORMAL
CC UR VC: ABNORMAL
SERVICE CMNT-IMP: ABNORMAL

## 2024-11-18 ENCOUNTER — TRANSCRIBE ORDERS (OUTPATIENT)
Facility: HOSPITAL | Age: 74
End: 2024-11-18

## 2024-11-18 DIAGNOSIS — Z12.31 ENCOUNTER FOR SCREENING MAMMOGRAM FOR MALIGNANT NEOPLASM OF BREAST: Primary | ICD-10-CM

## 2024-12-09 ENCOUNTER — HOSPITAL ENCOUNTER (OUTPATIENT)
Facility: HOSPITAL | Age: 74
Discharge: HOME | End: 2024-12-12
Payer: MEDICARE

## 2024-12-09 VITALS — BODY MASS INDEX: 19.12 KG/M2 | HEIGHT: 68 IN

## 2024-12-09 DIAGNOSIS — Z12.31 ENCOUNTER FOR SCREENING MAMMOGRAM FOR MALIGNANT NEOPLASM OF BREAST: ICD-10-CM

## 2024-12-09 PROCEDURE — 77063 BREAST TOMOSYNTHESIS BI: CPT

## 2025-02-26 DIAGNOSIS — J30.89 ENVIRONMENTAL AND SEASONAL ALLERGIES: ICD-10-CM

## 2025-02-26 RX ORDER — FLUTICASONE PROPIONATE 50 MCG
1 SPRAY, SUSPENSION (ML) NASAL DAILY PRN
Qty: 16 G | Refills: 5 | Status: SHIPPED | OUTPATIENT
Start: 2025-02-26

## 2025-02-26 NOTE — TELEPHONE ENCOUNTER
Last Office visit:  10/1/2024    Last Filled: 8/16/2024; Qty 16 g w/ 5 refills     Follow up visit:   Future Appointments   Date Time Provider Department Center   8/11/2025  8:30 AM IOC LAB VISIT [facility] DEP   8/18/2025  8:30 AM Faye Leslie DNP [facility] DEP

## 2025-04-04 ENCOUNTER — TELEMEDICINE (OUTPATIENT)
Facility: CLINIC | Age: 75
End: 2025-04-04
Payer: MEDICARE

## 2025-04-04 ENCOUNTER — HOSPITAL ENCOUNTER (OUTPATIENT)
Facility: HOSPITAL | Age: 75
Setting detail: SPECIMEN
Discharge: HOME | End: 2025-04-04
Payer: MEDICARE

## 2025-04-04 DIAGNOSIS — R31.9 URINARY TRACT INFECTION WITH HEMATURIA, SITE UNSPECIFIED: Primary | ICD-10-CM

## 2025-04-04 DIAGNOSIS — N39.0 URINARY TRACT INFECTION WITH HEMATURIA, SITE UNSPECIFIED: ICD-10-CM

## 2025-04-04 DIAGNOSIS — N39.0 URINARY TRACT INFECTION WITH HEMATURIA, SITE UNSPECIFIED: Primary | ICD-10-CM

## 2025-04-04 DIAGNOSIS — R31.9 URINARY TRACT INFECTION WITH HEMATURIA, SITE UNSPECIFIED: ICD-10-CM

## 2025-04-04 LAB
BILIRUBIN, URINE, POC: NEGATIVE
BLOOD URINE, POC: ABNORMAL
GLUCOSE URINE, POC: NEGATIVE
KETONES, URINE, POC: NEGATIVE
LEUKOCYTE ESTERASE, URINE, POC: ABNORMAL
NITRITE, URINE, POC: NEGATIVE
PH, URINE, POC: 6 (ref 4.6–8)
PROTEIN,URINE, POC: NEGATIVE
SPECIFIC GRAVITY, URINE, POC: 1.01 (ref 1–1.03)
URINALYSIS CLARITY, POC: ABNORMAL
URINALYSIS COLOR, POC: YELLOW
UROBILINOGEN, POC: ABNORMAL

## 2025-04-04 PROCEDURE — 1123F ACP DISCUSS/DSCN MKR DOCD: CPT | Performed by: NURSE PRACTITIONER

## 2025-04-04 PROCEDURE — G8420 CALC BMI NORM PARAMETERS: HCPCS | Performed by: NURSE PRACTITIONER

## 2025-04-04 PROCEDURE — 99214 OFFICE O/P EST MOD 30 MIN: CPT | Performed by: NURSE PRACTITIONER

## 2025-04-04 PROCEDURE — 3017F COLORECTAL CA SCREEN DOC REV: CPT | Performed by: NURSE PRACTITIONER

## 2025-04-04 PROCEDURE — 1036F TOBACCO NON-USER: CPT | Performed by: NURSE PRACTITIONER

## 2025-04-04 PROCEDURE — 1090F PRES/ABSN URINE INCON ASSESS: CPT | Performed by: NURSE PRACTITIONER

## 2025-04-04 PROCEDURE — 87088 URINE BACTERIA CULTURE: CPT

## 2025-04-04 PROCEDURE — 87086 URINE CULTURE/COLONY COUNT: CPT

## 2025-04-04 PROCEDURE — 1159F MED LIST DOCD IN RCRD: CPT | Performed by: NURSE PRACTITIONER

## 2025-04-04 PROCEDURE — G8427 DOCREV CUR MEDS BY ELIG CLIN: HCPCS | Performed by: NURSE PRACTITIONER

## 2025-04-04 PROCEDURE — 81003 URINALYSIS AUTO W/O SCOPE: CPT | Performed by: NURSE PRACTITIONER

## 2025-04-04 PROCEDURE — G8399 PT W/DXA RESULTS DOCUMENT: HCPCS | Performed by: NURSE PRACTITIONER

## 2025-04-04 PROCEDURE — 87186 SC STD MICRODIL/AGAR DIL: CPT

## 2025-04-04 RX ORDER — PHENAZOPYRIDINE HYDROCHLORIDE 100 MG/1
100 TABLET, FILM COATED ORAL 3 TIMES DAILY PRN
Qty: 30 TABLET | Refills: 0 | Status: SHIPPED | OUTPATIENT
Start: 2025-04-04

## 2025-04-04 RX ORDER — NITROFURANTOIN 25; 75 MG/1; MG/1
100 CAPSULE ORAL 2 TIMES DAILY
Qty: 14 CAPSULE | Refills: 0 | Status: SHIPPED | OUTPATIENT
Start: 2025-04-04 | End: 2025-04-11

## 2025-04-04 ASSESSMENT — PATIENT HEALTH QUESTIONNAIRE - PHQ9
2. FEELING DOWN, DEPRESSED OR HOPELESS: NOT AT ALL
SUM OF ALL RESPONSES TO PHQ QUESTIONS 1-9: 0
1. LITTLE INTEREST OR PLEASURE IN DOING THINGS: NOT AT ALL
SUM OF ALL RESPONSES TO PHQ QUESTIONS 1-9: 0

## 2025-04-04 NOTE — ASSESSMENT & PLAN NOTE
- Reports symptoms starting at 2 AM, including bladder spasms, burning during urination, and pink-tinged urine.  - Urine analysis shows 1+ leukocytes and 3+ blood, indicating infection.  - A urine culture will be conducted to confirm the diagnosis.  - Macrobid 100 mg twice daily for 7 days prescribed, with the prescription sent to the [facility] on [facility]. Pyridium prescribed for bladder spasms, to be taken three times daily as needed. Advised to maintain adequate hydration. If culture results indicate Macrobid is not effective, an alternative antibiotic will be considered.    10/1/2024:  POC UA reveals blood 2+, protein 1+, nitrates positive, leuks 2+    Symptoms of burning during urination, pressure, and urgency suggest a urinary tract infection.     A prescription for Macrobid, an antibiotic, has been sent to the [facility] on [facility].     A urine culture will be conducted to confirm the diagnosis. If the culture indicates that the current antibiotic is ineffective, an alternative medication will be prescribed.     Additionally, a medication for bladder spasms, Pyridium, has been prescribed, which may cause her urine to turn orange.     She has been advised to increase water intake, consume cranberry juice, and consider over-the-counter D-mannose. Proper hygiene practices, such as washing from front to back and not holding urine for extended periods, were also recommended.

## 2025-04-04 NOTE — PROGRESS NOTES
Estephanie Flores is a 75 y.o. female (: 1950) presenting to address:    Chief Complaint   Patient presents with    Urinary Burning    Hematuria    cloudy urine       There were no vitals filed for this visit.    \"Have you been to the ER, urgent care clinic since your last visit?  Hospitalized since your last visit?\"    NO    “Have you seen or consulted any other health care providers outside of [facility] since your last visit?”    NO

## 2025-04-06 LAB
BACTERIA SPEC CULT: ABNORMAL
CC UR VC: ABNORMAL
SERVICE CMNT-IMP: ABNORMAL

## 2025-04-07 ENCOUNTER — TELEPHONE (OUTPATIENT)
Facility: CLINIC | Age: 75
End: 2025-04-07

## 2025-04-07 ENCOUNTER — PATIENT MESSAGE (OUTPATIENT)
Facility: CLINIC | Age: 75
End: 2025-04-07

## 2025-04-07 DIAGNOSIS — Z16.12 UTI DUE TO EXTENDED-SPECTRUM BETA LACTAMASE (ESBL) PRODUCING ESCHERICHIA COLI: Primary | ICD-10-CM

## 2025-04-07 DIAGNOSIS — N39.0 UTI DUE TO EXTENDED-SPECTRUM BETA LACTAMASE (ESBL) PRODUCING ESCHERICHIA COLI: Primary | ICD-10-CM

## 2025-04-07 DIAGNOSIS — B96.29 UTI DUE TO EXTENDED-SPECTRUM BETA LACTAMASE (ESBL) PRODUCING ESCHERICHIA COLI: Primary | ICD-10-CM

## 2025-04-07 LAB
BACTERIA SPEC CULT: ABNORMAL
CC UR VC: ABNORMAL
SERVICE CMNT-IMP: ABNORMAL

## 2025-04-08 NOTE — TELEPHONE ENCOUNTER
Patient contacted the office asking to speak with a nurse in regards to [employer] messages.   [phone]

## 2025-04-08 NOTE — TELEPHONE ENCOUNTER
Please advise if ok to stop taking antibiotic.  Patient reports Dizziness, fatigue, nausea, chills along with hot spells.

## 2025-04-09 RX ORDER — SULFAMETHOXAZOLE AND TRIMETHOPRIM 800; 160 MG/1; MG/1
1 TABLET ORAL 2 TIMES DAILY
Qty: 14 TABLET | Refills: 0 | Status: SHIPPED | OUTPATIENT
Start: 2025-04-09 | End: 2025-04-16

## 2025-08-13 ENCOUNTER — HOSPITAL ENCOUNTER (OUTPATIENT)
Facility: HOSPITAL | Age: 75
Setting detail: SPECIMEN
Discharge: HOME | End: 2025-08-16
Payer: MEDICARE

## 2025-08-13 DIAGNOSIS — E78.2 MIXED HYPERLIPIDEMIA: ICD-10-CM

## 2025-08-13 LAB
ALBUMIN SERPL-MCNC: 3.7 G/DL (ref 3.4–5)
ALBUMIN/GLOB SERPL: 1.5 (ref 0.8–1.7)
ALP SERPL-CCNC: 51 U/L (ref 45–117)
ALT SERPL-CCNC: 15 U/L (ref 10–35)
ANION GAP SERPL CALC-SCNC: 10 MMOL/L (ref 3–18)
AST SERPL-CCNC: 18 U/L (ref 10–38)
BASOPHILS # BLD: 0.06 K/UL (ref 0–0.1)
BASOPHILS NFR BLD: 0.7 % (ref 0–2)
BILIRUB SERPL-MCNC: 0.5 MG/DL (ref 0.2–1)
BUN SERPL-MCNC: 24 MG/DL (ref 6–23)
BUN/CREAT SERPL: 40 (ref 12–20)
CALCIUM SERPL-MCNC: 10 MG/DL (ref 8.5–10.1)
CHLORIDE SERPL-SCNC: 103 MMOL/L (ref 98–107)
CHOLEST SERPL-MCNC: 257 MG/DL
CO2 SERPL-SCNC: 26 MMOL/L (ref 21–32)
CREAT SERPL-MCNC: 0.61 MG/DL (ref 0.6–1.3)
DIFFERENTIAL METHOD BLD: ABNORMAL
EOSINOPHIL # BLD: 0.12 K/UL (ref 0–0.4)
EOSINOPHIL NFR BLD: 1.5 % (ref 0–5)
ERYTHROCYTE [DISTWIDTH] IN BLOOD BY AUTOMATED COUNT: 13.2 % (ref 11.6–14.5)
GLOBULIN SER CALC-MCNC: 2.5 G/DL (ref 2–4)
GLUCOSE SERPL-MCNC: 74 MG/DL (ref 74–108)
HCT VFR BLD AUTO: 45.4 % (ref 35–45)
HDLC SERPL-MCNC: 84 MG/DL (ref 40–60)
HDLC SERPL: 3 (ref 0–5)
HGB BLD-MCNC: 14.6 G/DL (ref 12–16)
IMM GRANULOCYTES # BLD AUTO: 0.02 K/UL (ref 0–0.04)
IMM GRANULOCYTES NFR BLD AUTO: 0.2 % (ref 0–0.5)
LDLC SERPL CALC-MCNC: 152 MG/DL (ref 0–100)
LYMPHOCYTES # BLD: 1.55 K/UL (ref 0.9–3.6)
LYMPHOCYTES NFR BLD: 19.2 % (ref 21–52)
MCH RBC QN AUTO: 30.5 PG (ref 24–34)
MCHC RBC AUTO-ENTMCNC: 32.2 G/DL (ref 31–37)
MCV RBC AUTO: 95 FL (ref 78–100)
MONOCYTES # BLD: 0.44 K/UL (ref 0.05–1.2)
MONOCYTES NFR BLD: 5.5 % (ref 3–10)
NEUTS SEG # BLD: 5.88 K/UL (ref 1.8–8)
NEUTS SEG NFR BLD: 72.9 % (ref 40–73)
NRBC # BLD: 0 K/UL (ref 0–0.01)
NRBC BLD-RTO: 0 PER 100 WBC
PLATELET # BLD AUTO: 259 K/UL (ref 135–420)
PMV BLD AUTO: 10.9 FL (ref 9.2–11.8)
POTASSIUM SERPL-SCNC: 4.2 MMOL/L (ref 3.5–5.5)
PROT SERPL-MCNC: 6.2 G/DL (ref 6.4–8.2)
RBC # BLD AUTO: 4.78 M/UL (ref 4.2–5.3)
SODIUM SERPL-SCNC: 139 MMOL/L (ref 136–145)
TRIGL SERPL-MCNC: 106 MG/DL (ref 0–150)
VLDLC SERPL CALC-MCNC: 21 MG/DL
WBC # BLD AUTO: 8.1 K/UL (ref 4.6–13.2)

## 2025-08-13 PROCEDURE — 80053 COMPREHEN METABOLIC PANEL: CPT

## 2025-08-13 PROCEDURE — 85025 COMPLETE CBC W/AUTO DIFF WBC: CPT

## 2025-08-13 PROCEDURE — 36415 COLL VENOUS BLD VENIPUNCTURE: CPT

## 2025-08-13 PROCEDURE — 80061 LIPID PANEL: CPT

## 2025-08-15 SDOH — HEALTH STABILITY: PHYSICAL HEALTH: ON AVERAGE, HOW MANY DAYS PER WEEK DO YOU ENGAGE IN MODERATE TO STRENUOUS EXERCISE (LIKE A BRISK WALK)?: 7 DAYS

## 2025-08-15 SDOH — HEALTH STABILITY: PHYSICAL HEALTH: ON AVERAGE, HOW MANY MINUTES DO YOU ENGAGE IN EXERCISE AT THIS LEVEL?: 80 MIN

## 2025-08-15 ASSESSMENT — LIFESTYLE VARIABLES
HOW OFTEN DO YOU HAVE SIX OR MORE DRINKS ON ONE OCCASION: 1
HOW MANY STANDARD DRINKS CONTAINING ALCOHOL DO YOU HAVE ON A TYPICAL DAY: 1
HOW MANY STANDARD DRINKS CONTAINING ALCOHOL DO YOU HAVE ON A TYPICAL DAY: 1 OR 2
HOW OFTEN DO YOU HAVE A DRINK CONTAINING ALCOHOL: 4
HOW OFTEN DO YOU HAVE A DRINK CONTAINING ALCOHOL: 2-3 TIMES A WEEK

## 2025-08-15 ASSESSMENT — PATIENT HEALTH QUESTIONNAIRE - PHQ9
SUM OF ALL RESPONSES TO PHQ QUESTIONS 1-9: 0
1. LITTLE INTEREST OR PLEASURE IN DOING THINGS: NOT AT ALL
SUM OF ALL RESPONSES TO PHQ QUESTIONS 1-9: 0
2. FEELING DOWN, DEPRESSED OR HOPELESS: NOT AT ALL

## 2025-08-18 ENCOUNTER — OFFICE VISIT (OUTPATIENT)
Facility: CLINIC | Age: 75
End: 2025-08-18
Payer: MEDICARE

## 2025-08-18 VITALS
WEIGHT: 123 LBS | HEART RATE: 66 BPM | DIASTOLIC BLOOD PRESSURE: 77 MMHG | TEMPERATURE: 97.6 F | SYSTOLIC BLOOD PRESSURE: 133 MMHG | HEIGHT: 68 IN | BODY MASS INDEX: 18.64 KG/M2 | OXYGEN SATURATION: 100 % | RESPIRATION RATE: 16 BRPM

## 2025-08-18 DIAGNOSIS — M54.50 CHRONIC MIDLINE LOW BACK PAIN WITHOUT SCIATICA: ICD-10-CM

## 2025-08-18 DIAGNOSIS — Z00.00 MEDICARE ANNUAL WELLNESS VISIT, SUBSEQUENT: Primary | ICD-10-CM

## 2025-08-18 DIAGNOSIS — N95.2 VAGINAL ATROPHY: ICD-10-CM

## 2025-08-18 DIAGNOSIS — Z53.20 STATIN DECLINED: ICD-10-CM

## 2025-08-18 DIAGNOSIS — M25.511 CHRONIC RIGHT SHOULDER PAIN: ICD-10-CM

## 2025-08-18 DIAGNOSIS — G89.29 CHRONIC MIDLINE LOW BACK PAIN WITHOUT SCIATICA: ICD-10-CM

## 2025-08-18 DIAGNOSIS — Z71.89 ACP (ADVANCE CARE PLANNING): ICD-10-CM

## 2025-08-18 DIAGNOSIS — G89.29 CHRONIC RIGHT SHOULDER PAIN: ICD-10-CM

## 2025-08-18 DIAGNOSIS — J30.89 ENVIRONMENTAL AND SEASONAL ALLERGIES: ICD-10-CM

## 2025-08-18 DIAGNOSIS — T75.3XXS MOTION SICKNESS, SEQUELA: ICD-10-CM

## 2025-08-18 DIAGNOSIS — E78.2 MIXED HYPERLIPIDEMIA: ICD-10-CM

## 2025-08-18 PROCEDURE — 1159F MED LIST DOCD IN RCRD: CPT | Performed by: NURSE PRACTITIONER

## 2025-08-18 PROCEDURE — G8399 PT W/DXA RESULTS DOCUMENT: HCPCS | Performed by: NURSE PRACTITIONER

## 2025-08-18 PROCEDURE — 99497 ADVNCD CARE PLAN 30 MIN: CPT | Performed by: NURSE PRACTITIONER

## 2025-08-18 PROCEDURE — G8427 DOCREV CUR MEDS BY ELIG CLIN: HCPCS | Performed by: NURSE PRACTITIONER

## 2025-08-18 PROCEDURE — 3017F COLORECTAL CA SCREEN DOC REV: CPT | Performed by: NURSE PRACTITIONER

## 2025-08-18 PROCEDURE — 1123F ACP DISCUSS/DSCN MKR DOCD: CPT | Performed by: NURSE PRACTITIONER

## 2025-08-18 PROCEDURE — 1036F TOBACCO NON-USER: CPT | Performed by: NURSE PRACTITIONER

## 2025-08-18 PROCEDURE — 1090F PRES/ABSN URINE INCON ASSESS: CPT | Performed by: NURSE PRACTITIONER

## 2025-08-18 PROCEDURE — 1126F AMNT PAIN NOTED NONE PRSNT: CPT | Performed by: NURSE PRACTITIONER

## 2025-08-18 PROCEDURE — G0439 PPPS, SUBSEQ VISIT: HCPCS | Performed by: NURSE PRACTITIONER

## 2025-08-18 PROCEDURE — 99214 OFFICE O/P EST MOD 30 MIN: CPT | Performed by: NURSE PRACTITIONER

## 2025-08-18 PROCEDURE — G8420 CALC BMI NORM PARAMETERS: HCPCS | Performed by: NURSE PRACTITIONER

## 2025-08-18 RX ORDER — EZETIMIBE 10 MG/1
10 TABLET ORAL DAILY
Qty: 90 TABLET | Refills: 1 | Status: SHIPPED | OUTPATIENT
Start: 2025-08-18

## 2025-08-18 RX ORDER — FLUTICASONE PROPIONATE 50 MCG
1 SPRAY, SUSPENSION (ML) NASAL DAILY PRN
Qty: 16 G | Refills: 5 | Status: SHIPPED | OUTPATIENT
Start: 2025-08-18

## 2025-08-18 RX ORDER — SCOPOLAMINE 1 MG/3D
1 PATCH, EXTENDED RELEASE TRANSDERMAL
Qty: 3 PATCH | Refills: 0 | Status: SHIPPED | OUTPATIENT
Start: 2025-08-18

## 2025-08-18 RX ORDER — MONTELUKAST SODIUM 10 MG/1
10 TABLET ORAL DAILY
Qty: 100 TABLET | Refills: 3 | Status: SHIPPED | OUTPATIENT
Start: 2025-08-18

## 2025-08-18 SDOH — ECONOMIC STABILITY: FOOD INSECURITY: WITHIN THE PAST 12 MONTHS, YOU WORRIED THAT YOUR FOOD WOULD RUN OUT BEFORE YOU GOT MONEY TO BUY MORE.: NEVER TRUE

## 2025-08-18 SDOH — ECONOMIC STABILITY: FOOD INSECURITY: WITHIN THE PAST 12 MONTHS, THE FOOD YOU BOUGHT JUST DIDN'T LAST AND YOU DIDN'T HAVE MONEY TO GET MORE.: NEVER TRUE

## 2025-08-20 PROBLEM — T75.3XXA MOTION SICKNESS: Status: ACTIVE | Noted: 2025-08-20

## 2025-08-20 PROBLEM — M25.511 CHRONIC RIGHT SHOULDER PAIN: Status: ACTIVE | Noted: 2025-08-20

## 2025-08-20 PROBLEM — G89.29 CHRONIC RIGHT SHOULDER PAIN: Status: ACTIVE | Noted: 2025-08-20

## (undated) DEVICE — MEDI-VAC NON-CONDUCTIVE SUCTION TUBING: Brand: CARDINAL HEALTH

## (undated) DEVICE — AIRLIFE™ NASAL OXYGEN CANNULA CURVED, NONFLARED TIP WITH 14 FOOT (4.3 M) CRUSH-RESISTANT TUBING, OVER-THE-EAR STYLE: Brand: AIRLIFE™

## (undated) DEVICE — CATH IV SAFE STR 22GX1IN BLU -- PROTECTIV PLUS

## (undated) DEVICE — Device

## (undated) DEVICE — FLEX ADVANTAGE 1500CC: Brand: FLEX ADVANTAGE

## (undated) DEVICE — FORCEPS BX L240CM JAW DIA2.8MM L CAP W/ NDL MIC MESH TOOTH